# Patient Record
Sex: MALE | Race: WHITE | NOT HISPANIC OR LATINO | Employment: OTHER | URBAN - METROPOLITAN AREA
[De-identification: names, ages, dates, MRNs, and addresses within clinical notes are randomized per-mention and may not be internally consistent; named-entity substitution may affect disease eponyms.]

---

## 2023-07-05 ENCOUNTER — OFFICE VISIT (OUTPATIENT)
Dept: PRIMARY CARE | Facility: CLINIC | Age: 73
End: 2023-07-05
Payer: MEDICARE

## 2023-07-05 VITALS
SYSTOLIC BLOOD PRESSURE: 122 MMHG | TEMPERATURE: 97.1 F | BODY MASS INDEX: 34.67 KG/M2 | HEART RATE: 67 BPM | WEIGHT: 256 LBS | DIASTOLIC BLOOD PRESSURE: 71 MMHG | HEIGHT: 72 IN | OXYGEN SATURATION: 95 %

## 2023-07-05 DIAGNOSIS — I10 ESSENTIAL HYPERTENSION: ICD-10-CM

## 2023-07-05 DIAGNOSIS — R00.2 PALPITATIONS: ICD-10-CM

## 2023-07-05 DIAGNOSIS — Z00.00 ENCOUNTER FOR WELLNESS EXAMINATION: Primary | ICD-10-CM

## 2023-07-05 DIAGNOSIS — R42 DIZZINESS: ICD-10-CM

## 2023-07-05 DIAGNOSIS — M10.9 GOUT, UNSPECIFIED CAUSE, UNSPECIFIED CHRONICITY, UNSPECIFIED SITE: ICD-10-CM

## 2023-07-05 DIAGNOSIS — E78.2 MIXED HYPERLIPIDEMIA: ICD-10-CM

## 2023-07-05 DIAGNOSIS — E55.9 VITAMIN D DEFICIENCY: ICD-10-CM

## 2023-07-05 DIAGNOSIS — Z00.00 ROUTINE GENERAL MEDICAL EXAMINATION AT HEALTH CARE FACILITY: ICD-10-CM

## 2023-07-05 DIAGNOSIS — N18.32 CKD STAGE G3B/A2, GFR 30-44 AND ALBUMIN CREATININE RATIO 30-299 MG/G (MULTI): ICD-10-CM

## 2023-07-05 DIAGNOSIS — N40.0 BENIGN PROSTATIC HYPERPLASIA WITHOUT LOWER URINARY TRACT SYMPTOMS: ICD-10-CM

## 2023-07-05 PROBLEM — K21.9 ESOPHAGEAL REFLUX: Status: ACTIVE | Noted: 2023-07-05

## 2023-07-05 PROBLEM — Z96.21 COCHLEAR IMPLANT IN PLACE: Status: ACTIVE | Noted: 2017-08-01

## 2023-07-05 PROBLEM — E78.5 HYPERLIPEMIA: Status: ACTIVE | Noted: 2017-07-06

## 2023-07-05 LAB
ALANINE AMINOTRANSFERASE (SGPT) (U/L) IN SER/PLAS: 29 U/L (ref 10–52)
ALBUMIN (G/DL) IN SER/PLAS: 4.5 G/DL (ref 3.4–5)
ALKALINE PHOSPHATASE (U/L) IN SER/PLAS: 64 U/L (ref 33–136)
ANION GAP IN SER/PLAS: 13 MMOL/L (ref 10–20)
ASPARTATE AMINOTRANSFERASE (SGOT) (U/L) IN SER/PLAS: 19 U/L (ref 9–39)
BILIRUBIN TOTAL (MG/DL) IN SER/PLAS: 0.9 MG/DL (ref 0–1.2)
CALCIDIOL (25 OH VITAMIN D3) (NG/ML) IN SER/PLAS: 34 NG/ML
CALCIUM (MG/DL) IN SER/PLAS: 9.9 MG/DL (ref 8.6–10.6)
CARBON DIOXIDE, TOTAL (MMOL/L) IN SER/PLAS: 28 MMOL/L (ref 21–32)
CHLORIDE (MMOL/L) IN SER/PLAS: 105 MMOL/L (ref 98–107)
CHOLESTEROL (MG/DL) IN SER/PLAS: 188 MG/DL (ref 0–199)
CHOLESTEROL IN HDL (MG/DL) IN SER/PLAS: 42.9 MG/DL
CHOLESTEROL/HDL RATIO: 4.4
COBALAMIN (VITAMIN B12) (PG/ML) IN SER/PLAS: 343 PG/ML (ref 211–911)
CREAT UR STRIP-MCNC: 100 MG/DL
CREATININE (MG/DL) IN SER/PLAS: 1.61 MG/DL (ref 0.5–1.3)
ERYTHROCYTE DISTRIBUTION WIDTH (RATIO) BY AUTOMATED COUNT: 13.5 % (ref 11.5–14.5)
ERYTHROCYTE MEAN CORPUSCULAR HEMOGLOBIN CONCENTRATION (G/DL) BY AUTOMATED: 33.5 G/DL (ref 32–36)
ERYTHROCYTE MEAN CORPUSCULAR VOLUME (FL) BY AUTOMATED COUNT: 93 FL (ref 80–100)
ERYTHROCYTES (10*6/UL) IN BLOOD BY AUTOMATED COUNT: 4.2 X10E12/L (ref 4.5–5.9)
GFR MALE: 45 ML/MIN/1.73M2
GLUCOSE (MG/DL) IN SER/PLAS: 88 MG/DL (ref 74–99)
HEMATOCRIT (%) IN BLOOD BY AUTOMATED COUNT: 39.1 % (ref 41–52)
HEMOGLOBIN (G/DL) IN BLOOD: 13.1 G/DL (ref 13.5–17.5)
LDL: 114 MG/DL (ref 0–99)
LEUKOCYTES (10*3/UL) IN BLOOD BY AUTOMATED COUNT: 6 X10E9/L (ref 4.4–11.3)
MICROALBUMIN UR TEST STR-MCNC: 30 MG/L
NRBC (PER 100 WBCS) BY AUTOMATED COUNT: 0 /100 WBC (ref 0–0)
PLATELETS (10*3/UL) IN BLOOD AUTOMATED COUNT: 182 X10E9/L (ref 150–450)
POTASSIUM (MMOL/L) IN SER/PLAS: 4.7 MMOL/L (ref 3.5–5.3)
PROSTATE SPECIFIC AG (NG/ML) IN SER/PLAS: 2.26 NG/ML (ref 0–4)
PROT/CREAT UR: <30 UG/MG CREAT
PROTEIN TOTAL: 7.4 G/DL (ref 6.4–8.2)
SODIUM (MMOL/L) IN SER/PLAS: 141 MMOL/L (ref 136–145)
TRIGLYCERIDE (MG/DL) IN SER/PLAS: 157 MG/DL (ref 0–149)
URATE (MG/DL) IN SER/PLAS: 6.2 MG/DL (ref 4–7.5)
UREA NITROGEN (MG/DL) IN SER/PLAS: 29 MG/DL (ref 6–23)
VLDL: 31 MG/DL (ref 0–40)

## 2023-07-05 PROCEDURE — 3074F SYST BP LT 130 MM HG: CPT | Performed by: INTERNAL MEDICINE

## 2023-07-05 PROCEDURE — 82043 UR ALBUMIN QUANTITATIVE: CPT | Performed by: INTERNAL MEDICINE

## 2023-07-05 PROCEDURE — 80061 LIPID PANEL: CPT

## 2023-07-05 PROCEDURE — 82570 ASSAY OF URINE CREATININE: CPT | Performed by: INTERNAL MEDICINE

## 2023-07-05 PROCEDURE — 84550 ASSAY OF BLOOD/URIC ACID: CPT

## 2023-07-05 PROCEDURE — G0444 DEPRESSION SCREEN ANNUAL: HCPCS | Performed by: INTERNAL MEDICINE

## 2023-07-05 PROCEDURE — 80053 COMPREHEN METABOLIC PANEL: CPT

## 2023-07-05 PROCEDURE — 99215 OFFICE O/P EST HI 40 MIN: CPT | Performed by: INTERNAL MEDICINE

## 2023-07-05 PROCEDURE — 1170F FXNL STATUS ASSESSED: CPT | Performed by: INTERNAL MEDICINE

## 2023-07-05 PROCEDURE — 1160F RVW MEDS BY RX/DR IN RCRD: CPT | Performed by: INTERNAL MEDICINE

## 2023-07-05 PROCEDURE — 82607 VITAMIN B-12: CPT

## 2023-07-05 PROCEDURE — 93000 ELECTROCARDIOGRAM COMPLETE: CPT | Performed by: INTERNAL MEDICINE

## 2023-07-05 PROCEDURE — 82306 VITAMIN D 25 HYDROXY: CPT

## 2023-07-05 PROCEDURE — 3078F DIAST BP <80 MM HG: CPT | Performed by: INTERNAL MEDICINE

## 2023-07-05 PROCEDURE — G0439 PPPS, SUBSEQ VISIT: HCPCS | Performed by: INTERNAL MEDICINE

## 2023-07-05 PROCEDURE — 1159F MED LIST DOCD IN RCRD: CPT | Performed by: INTERNAL MEDICINE

## 2023-07-05 PROCEDURE — 85027 COMPLETE CBC AUTOMATED: CPT

## 2023-07-05 PROCEDURE — 84153 ASSAY OF PSA TOTAL: CPT

## 2023-07-05 PROCEDURE — 1036F TOBACCO NON-USER: CPT | Performed by: INTERNAL MEDICINE

## 2023-07-05 RX ORDER — ATORVASTATIN CALCIUM 10 MG/1
1 TABLET, FILM COATED ORAL DAILY
COMMUNITY
Start: 2013-12-17

## 2023-07-05 RX ORDER — LOSARTAN POTASSIUM 25 MG/1
1 TABLET ORAL DAILY
COMMUNITY
Start: 2021-07-16 | End: 2023-07-24

## 2023-07-05 RX ORDER — CHOLECALCIFEROL (VITAMIN D3) 50 MCG
1 TABLET ORAL DAILY
COMMUNITY
Start: 2015-09-03

## 2023-07-05 RX ORDER — PANTOPRAZOLE SODIUM 40 MG/1
TABLET, DELAYED RELEASE ORAL
COMMUNITY
Start: 2022-01-12 | End: 2024-05-16 | Stop reason: DRUGHIGH

## 2023-07-05 RX ORDER — FAMOTIDINE 20 MG/1
1 TABLET, FILM COATED ORAL NIGHTLY
COMMUNITY
Start: 2020-04-20 | End: 2023-10-23

## 2023-07-05 RX ORDER — ALLOPURINOL 300 MG/1
1 TABLET ORAL DAILY
COMMUNITY
Start: 2013-09-23 | End: 2024-01-29

## 2023-07-05 RX ORDER — TRIAMCINOLONE ACETONIDE 1 MG/G
CREAM TOPICAL
COMMUNITY
Start: 2023-06-08

## 2023-07-05 ASSESSMENT — ACTIVITIES OF DAILY LIVING (ADL)
DRESSING: INDEPENDENT
TAKING_MEDICATION: INDEPENDENT
DOING_HOUSEWORK: INDEPENDENT
BATHING: INDEPENDENT
GROCERY_SHOPPING: INDEPENDENT
MANAGING_FINANCES: INDEPENDENT

## 2023-07-05 ASSESSMENT — ENCOUNTER SYMPTOMS
OCCASIONAL FEELINGS OF UNSTEADINESS: 1
LOSS OF SENSATION IN FEET: 0
DEPRESSION: 0

## 2023-07-05 ASSESSMENT — PATIENT HEALTH QUESTIONNAIRE - PHQ9
2. FEELING DOWN, DEPRESSED OR HOPELESS: NOT AT ALL
SUM OF ALL RESPONSES TO PHQ9 QUESTIONS 1 AND 2: 0
1. LITTLE INTEREST OR PLEASURE IN DOING THINGS: NOT AT ALL

## 2023-07-05 NOTE — PROGRESS NOTES
Subjective   John Allen is a 72 y.o. male who presents for Medicare Annual Wellness Visit Subsequent.  HPI  Past medical history includes hypertension, hyperlipidemia, obesity, gastritis and PUD, and arthritic problems.    Ongoing issues after cataract surgery, seeing his ophthalmologist and retina specialist.       He has lost about 5lbs since last visit in JAN.  Diet  Continues aerobics 4x/week.      See visit JAN 2022 regarding recurrent GERD, continues on PPI on an as needed basis.  Has intermittent flares, responded with 10 day course.     Previously only on a beta blocker, metoprolol, for blood pressure. Denies any history of arrhythmia or tachycardia. However, he does continue to have intermittent palpitations. Typically at night, lasting several minutes. Every 2-3 weeks.  Occasionally with sweating, rare.  No change in frequency since discontinuation of beta-blocker.  Previous EKGs reviewed and unremarkable.  Losartan 25 mg daily started in July 2021.  Blood pressure generally controlled, he reports it is usually under 130/80 at home.   He is on atorvastatin and fish oil for primary prevention of cardiovascular disease.  LH and renal fxn improved after discontinuing metop succ.     Occasional vertigo, has had vestibular rehab in the past; ever since cochlear surgery.  More recently getting it while trying to read/focus on nearby object, not necessarily with movement.     He also has a lot of arthritic issues, he has had bilateral shoulder replacement, says he also needs bilateral knee replacements but trying to avoid, coping.  Also with hx of sciatic pain.     Previously re: abdominal pain, saw Dr. Ortiz who confirmed left inguinal hernia, but thought pain was MSK and likely unrelated to hernia. Offered elective if preferred. Not a concern.     He is , goes to the gym fairly regularly, remote smoker.   Exercising regularly, weights, aerobics several times per week.           Active  Problems  Problems    · Abdominal pain, epigastric (789.06) (R10.13)   · Benign enlargement of prostate (600.00) (N40.0)   · CKD stage G3b/A2, GFR 30-44 and albumin creatinine ratio  mg/g (585.3)  (N18.32)   · Class 2 obesity due to excess calories with body mass index (BMI) of 35.0 to 35.9 in  adult (278.00,V85.35) (E66.09,Z68.35)   · Elevated serum cholesterol (272.9) (E78.9)   · Esophageal reflux (530.81) (K21.9)   · Gastritis, chronic (535.10) (K29.50)   · Gout (274.9) (M10.9)   · Hemorrhoids (455.6) (K64.9)   · Hypertension (401.9) (I10)   · Added by Problem List Migration; 2013-8-25   · Irregular bowel habits (569.89) (R19.8)   · Knee pain (719.46) (M25.569)   · Left inguinal hernia (550.90) (K40.90)   · Left sciatic nerve pain (724.3) (M54.32)   · Nausea in adult (787.02) (R11.0)   · Osteoarthrosis, shoulder region (715.91) (M19.019)   · Palpitations (785.1) (R00.2)   · Primary osteoarthritis of both hips (715.15) (M16.0)   · Primary osteoarthritis of left knee (715.16) (M17.12)   · Right kidney stone (592.0) (N20.0)   · Seborrheic keratoses (702.19) (L82.1)   · Sensorineural hearing loss, bilateral (389.18) (H90.3)   · Trigger ring finger of left hand (727.03) (M65.342)   · Vitamin D deficiency (268.9) (E55.9)     Past Medical History  Problems    · History of Dysuria (788.1) (R30.0)   · History of Encounter for vaccination (V05.9) (Z23)   · Resolved Date: 20 May 2017   · History of Hearing loss, bilateral (389.9) (H91.93)   · History of abdominal pain (V13.89) (Z87.898)   · Resolved Date: 27 Apr 2016   · History of diarrhea (V12.79) (Z87.898)   · Resolved Date: 20 Nov 2017   · History of gastric ulcer (V12.79) (Z87.11)   · History of Pre-operative exam (V72.84) (Z01.818)   · Resolved Date: 20 May 2017   · History of Screening for abdominal aortic aneurysm (V81.2) (Z13.6)   · Resolved Date: 20 May 2017     Surgical History  Problems    · History of Arthroscopy Knee Left   · History of Arthroscopy Knee  "Right   · History of Cochlear implant surgery   · History of Complete Colonoscopy   · Dr. Farhat Carrasco 1/31/2012 repeat in 5 years   · History of Umbilical hernia repair     Family History  Mother    · Family history of Lung Cancer (V16.1)  Father    · Denied: Family history of Acute Myocardial Infarction   · Family history of Colon Cancer (V16.0)  Maternal Grandmother    · Family history of Diabetes Mellitus (V18.0)  Maternal Grandfather    · Family history of Acute Myocardial Infarction (V17.3)     Social History  Problems    · Former smoker (V15.82) (Z87.891)   · stopped 40 yrs ago   · Marital History - Currently    · Never Drank Alcohol   · Working Part-time              Objective   /76   Pulse 70   Temp 36.2 °C (97.1 °F)   Ht 1.829 m (6')   Wt 116 kg (256 lb)   SpO2 95%   BMI 34.72 kg/m²    Physical Exam  Gen: NAD, pleasant, A&Ox3  HEENT: PERRL, EOMI, MMM, OP clear  Neck: supple, no thyromegaly, no JVD, normal carotid upstroke  Pulm: lungs CTAB, good air movement  CV: RRR, no m/r/g, 2+ DP pulses  Abd: NABS, soft, mild epigastric discomfort, ND no HSM  : normal male anatomy, left inguinal hernia, no perirectal pathology, normal tone, smooth, non-nodular prostate ~25g  Ext: no peripheral edema  Neuro: CN II-XII intact, no focal sensory or motor deficits, normal reflexes  Skin: Scattered benign lesions including seborrheic keratoses and angiomas, small callous v plantar wart on lateral 4th digit left; left 4th flexor tendon with scarring and some sticking    Assessment/Plan      Constipation with intermittent pain: recommend daily fiber supplementation (Metamucil); says it has been a \"godsend\"     GERD: Continue pantoprazole prn, famotidine daily; f/u with GI prn     Left inguinal hernia: Referred to general surgery, can return on elective basis currently asymptomatic     Hypertension/CKD G3b/A2:   - improved albuminuria blood pressure control since starting losartan 25 mg, continue  - " unremarkable EKG, JUL 2022; off beta-blocker without change in pattern of palpitations, minimally symptomatic    Hyperlipidemia: Continue statin     Hx of gout: recheck uric acid levels, cont allopurinol     Health maintenance  -Last colonoscopy: 5/14/2020, 1 HP Repeat: 5 years, given family hx  -PSA: 2.03 ng/mL JUL 2022  -Smoking history: Remote smoker, reports negative AAA  -Counseled regarding diet and exercise  -Immunizations: UTD  - annual derm visit  -Followup in 6-12 months, sooner if needed  Problem List Items Addressed This Visit    None           Alejandro Argueta MD

## 2023-07-24 DIAGNOSIS — N18.32 CKD STAGE G3B/A2, GFR 30-44 AND ALBUMIN CREATININE RATIO 30-299 MG/G (MULTI): Primary | ICD-10-CM

## 2023-07-24 DIAGNOSIS — I10 ESSENTIAL HYPERTENSION: ICD-10-CM

## 2023-07-24 RX ORDER — LOSARTAN POTASSIUM 25 MG/1
25 TABLET ORAL DAILY
Qty: 90 TABLET | Refills: 3 | Status: SHIPPED | OUTPATIENT
Start: 2023-07-24

## 2023-08-21 ENCOUNTER — OFFICE VISIT (OUTPATIENT)
Dept: PRIMARY CARE | Facility: CLINIC | Age: 73
End: 2023-08-21
Payer: MEDICARE

## 2023-08-21 VITALS
HEART RATE: 82 BPM | SYSTOLIC BLOOD PRESSURE: 129 MMHG | OXYGEN SATURATION: 96 % | TEMPERATURE: 97.3 F | BODY MASS INDEX: 34.72 KG/M2 | DIASTOLIC BLOOD PRESSURE: 75 MMHG | WEIGHT: 256 LBS

## 2023-08-21 DIAGNOSIS — M54.50 ACUTE LEFT-SIDED LOW BACK PAIN WITHOUT SCIATICA: Primary | ICD-10-CM

## 2023-08-21 PROCEDURE — 1160F RVW MEDS BY RX/DR IN RCRD: CPT | Performed by: INTERNAL MEDICINE

## 2023-08-21 PROCEDURE — 1036F TOBACCO NON-USER: CPT | Performed by: INTERNAL MEDICINE

## 2023-08-21 PROCEDURE — 1159F MED LIST DOCD IN RCRD: CPT | Performed by: INTERNAL MEDICINE

## 2023-08-21 PROCEDURE — 3074F SYST BP LT 130 MM HG: CPT | Performed by: INTERNAL MEDICINE

## 2023-08-21 PROCEDURE — 3078F DIAST BP <80 MM HG: CPT | Performed by: INTERNAL MEDICINE

## 2023-08-21 PROCEDURE — 99213 OFFICE O/P EST LOW 20 MIN: CPT | Performed by: INTERNAL MEDICINE

## 2023-08-21 RX ORDER — DICLOFENAC SODIUM 10 MG/G
4 GEL TOPICAL 4 TIMES DAILY
Qty: 100 G | Refills: 1 | Status: SHIPPED | OUTPATIENT
Start: 2023-08-21

## 2023-08-21 NOTE — PROGRESS NOTES
Subjective   John Allen is a 73 y.o. male who presents for Back Pain.  HPI  Started about 3 weeks ago while doing leg curls.  Felt pain in back of thigh and left buttock.  Has since decreased weight and not hurting.  Last week started having LL back pain occurring with walking fast.  Continued to be sore, turned and had a sharp pain in the left lower back, took breath away.  No radiation.  Now using a cane.  Took tylenol and heating pad.  The next day slightly better.  Went shopping and pain increased throughout the day.  Today felt okay in the morning and did 30 minutes on the elliptical but when walking the track started hurting again.    Several days did leg press and leg curls and not bothered.    When radiating, just going down from low back to low buttock, more of a weakness.  Only last Saturday did he have more of an electric shock pain, even then mostly in the lower back.  Radiating out, not shooting.     Worse with walking, only in the low back at those times.  No distal numbness, tingling or weakness.    He called Dr. Hay but was told he needs referral and MRI.      Objective   /75   Pulse 82   Temp 36.3 °C (97.3 °F)   Wt 116 kg (256 lb)   SpO2 96%   BMI 34.72 kg/m²    Physical Exam  NAD, slightly antalgic gait  Normal strength and sensation to BLLE, symmetric  Neg Trendelenberg  Neg SLR  Neg log roll  No trochanteric TTP  Spine midline  No reproducible pain    Assessment/Plan   LBP: no neurogenic/radicular component on exam and history  - no imaging needed at this time  - consider PT  - continue conservative management, stretching and gentle strengthening  - trial of topical NSAID, systemic should be avoided  Problem List Items Addressed This Visit    None           Alejandro Argueta MD

## 2023-10-12 ENCOUNTER — APPOINTMENT (OUTPATIENT)
Dept: URGENT CARE | Facility: CLINIC | Age: 73
End: 2023-10-12
Payer: MEDICARE

## 2023-10-13 ENCOUNTER — APPOINTMENT (OUTPATIENT)
Dept: RADIOLOGY | Facility: HOSPITAL | Age: 73
End: 2023-10-13
Payer: MEDICARE

## 2023-10-13 ENCOUNTER — HOSPITAL ENCOUNTER (EMERGENCY)
Facility: HOSPITAL | Age: 73
Discharge: HOME | End: 2023-10-13
Attending: EMERGENCY MEDICINE
Payer: MEDICARE

## 2023-10-13 VITALS
HEIGHT: 72 IN | DIASTOLIC BLOOD PRESSURE: 67 MMHG | SYSTOLIC BLOOD PRESSURE: 168 MMHG | WEIGHT: 250 LBS | TEMPERATURE: 97.7 F | OXYGEN SATURATION: 98 % | RESPIRATION RATE: 18 BRPM | HEART RATE: 82 BPM | BODY MASS INDEX: 33.86 KG/M2

## 2023-10-13 DIAGNOSIS — R05.1 ACUTE COUGH: Primary | ICD-10-CM

## 2023-10-13 LAB
FLUAV RNA RESP QL NAA+PROBE: NOT DETECTED
FLUBV RNA RESP QL NAA+PROBE: NOT DETECTED
SARS-COV-2 RNA RESP QL NAA+PROBE: NOT DETECTED

## 2023-10-13 PROCEDURE — 87636 SARSCOV2 & INF A&B AMP PRB: CPT | Performed by: STUDENT IN AN ORGANIZED HEALTH CARE EDUCATION/TRAINING PROGRAM

## 2023-10-13 PROCEDURE — 70360 X-RAY EXAM OF NECK: CPT

## 2023-10-13 PROCEDURE — 71045 X-RAY EXAM CHEST 1 VIEW: CPT | Performed by: RADIOLOGY

## 2023-10-13 PROCEDURE — 99284 EMERGENCY DEPT VISIT MOD MDM: CPT | Mod: 25

## 2023-10-13 PROCEDURE — 71045 X-RAY EXAM CHEST 1 VIEW: CPT

## 2023-10-13 PROCEDURE — 99284 EMERGENCY DEPT VISIT MOD MDM: CPT | Performed by: EMERGENCY MEDICINE

## 2023-10-13 RX ORDER — PREDNISONE 50 MG/1
50 TABLET ORAL DAILY
Qty: 5 TABLET | Refills: 0 | Status: SHIPPED | OUTPATIENT
Start: 2023-10-13 | End: 2023-10-18

## 2023-10-13 ASSESSMENT — LIFESTYLE VARIABLES
HAVE YOU EVER FELT YOU SHOULD CUT DOWN ON YOUR DRINKING: NO
HAVE PEOPLE ANNOYED YOU BY CRITICIZING YOUR DRINKING: NO
EVER HAD A DRINK FIRST THING IN THE MORNING TO STEADY YOUR NERVES TO GET RID OF A HANGOVER: NO
REASON UNABLE TO ASSESS: NO
EVER FELT BAD OR GUILTY ABOUT YOUR DRINKING: NO

## 2023-10-13 ASSESSMENT — COLUMBIA-SUICIDE SEVERITY RATING SCALE - C-SSRS
6. HAVE YOU EVER DONE ANYTHING, STARTED TO DO ANYTHING, OR PREPARED TO DO ANYTHING TO END YOUR LIFE?: NO
2. HAVE YOU ACTUALLY HAD ANY THOUGHTS OF KILLING YOURSELF?: NO
1. IN THE PAST MONTH, HAVE YOU WISHED YOU WERE DEAD OR WISHED YOU COULD GO TO SLEEP AND NOT WAKE UP?: NO

## 2023-10-13 ASSESSMENT — PAIN SCALES - GENERAL
PAINLEVEL_OUTOF10: 0 - NO PAIN
PAINLEVEL_OUTOF10: 2

## 2023-10-13 ASSESSMENT — PAIN DESCRIPTION - LOCATION: LOCATION: THROAT

## 2023-10-13 ASSESSMENT — PAIN - FUNCTIONAL ASSESSMENT
PAIN_FUNCTIONAL_ASSESSMENT: 0-10
PAIN_FUNCTIONAL_ASSESSMENT: 0-10

## 2023-10-13 NOTE — ED PROVIDER NOTES
HPI   Chief Complaint   Patient presents with    Cough     Patient states cough x1 week and also feels like something is stuck in his throat       HPI  Patient is a 73-year-old male with past medical history of hyperlipidemia, hypertension, GERD who presents the emergency department today due to concern for cough.  He states he feels like tickling in his throat.  He states that cough began Monday.  He denies any fevers, chills, chest pain, abdominal pain.  He denies any recent sick contacts.  He took 2 negative COVID test at home however was waiting for third today.  He was prescribed benzoate at urgent care which has not helped with the symptoms.  He denies any chest pain, abdominal pain, nausea, vomiting.                    [place] Coma Scale Score: 15                  Patient History   Past Medical History:   Diagnosis Date    Dysuria     Dysuria    Encounter for immunization 11/07/2016    Encounter for vaccination    Encounter for other preprocedural examination 11/07/2016    Pre-operative exam    Encounter for screening for cardiovascular disorders 05/10/2016    Screening for abdominal aortic aneurysm    Personal history of other specified conditions 04/27/2016    History of diarrhea    Personal history of other specified conditions 09/01/2015    History of abdominal pain    Personal history of peptic ulcer disease 04/20/2020    History of gastric ulcer    Unspecified hearing loss, bilateral 09/03/2015    Hearing loss, bilateral     Past Surgical History:   Procedure Laterality Date    COLONOSCOPY  04/23/2015    Complete Colonoscopy    KNEE ARTHROSCOPY W/ DEBRIDEMENT  12/17/2013    Arthroscopy Knee Left    KNEE ARTHROSCOPY W/ DEBRIDEMENT  12/17/2013    Arthroscopy Knee Right    OTHER SURGICAL HISTORY  08/14/2019    Umbilical hernia repair    OTHER SURGICAL HISTORY  08/14/2019    Cochlear implant surgery     No family history on file.  Social History     Tobacco Use    Smoking status: Former     Types:  Cigarettes    Smokeless tobacco: Never   Substance Use Topics    Alcohol use: Not Currently    Drug use: Never       Physical Exam   ED Triage Vitals [10/13/23 0712]   Temp Heart Rate Resp BP   36.5 °C (97.7 °F) 83 18 170/81      SpO2 Temp Source Heart Rate Source Patient Position   98 % Temporal Monitor Sitting      BP Location FiO2 (%)     Right arm --       Physical Exam  Vitals and nursing note reviewed.   Constitutional:       General: He is not in acute distress.     Appearance: He is well-developed.   HENT:      Head: Normocephalic and atraumatic.   Eyes:      Conjunctiva/sclera: Conjunctivae normal.   Cardiovascular:      Rate and Rhythm: Normal rate and regular rhythm.      Heart sounds: No murmur heard.  Pulmonary:      Effort: Pulmonary effort is normal. No respiratory distress.      Breath sounds: Normal breath sounds.   Abdominal:      Palpations: Abdomen is soft.      Tenderness: There is no abdominal tenderness.   Musculoskeletal:         General: No swelling.      Cervical back: Neck supple. No tenderness.   Lymphadenopathy:      Cervical: No cervical adenopathy.   Skin:     General: Skin is warm and dry.      Capillary Refill: Capillary refill takes less than 2 seconds.   Neurological:      Mental Status: He is alert.   Psychiatric:         Mood and Affect: Mood normal.       ED Course & MDM   ED Course as of 10/13/23 1628   Fri Oct 13, 2023   0738 Patient is a 73-year-old male who presents the ER due to concern for cough.  On arrival, patient was in no acute distress and vital signs are stable.  Did get COVID and influenza swab for patient.  He did also get chest x-ray and neck x-ray as patient family member him more requesting this.  Do not feel nasopharyngeal endoscopy warranted at this time as he did not have concern for obstruction.  He does not have any signs of Brenda's angina. [MJ]   0909 Influenza A, and B PCR [MJ]   0910 Flu A Result: Not Detected [MJ]   0910 Flu B Result: Not Detected  [MJ]   0910 Sars-CoV-2 PCR, Symptomatic [MJ]   0910 Coronavirus 2019, PCR: Not Detected  COVID and flu negative. [MJ]   0910 XR chest 1 view  No acute cardiopulmonary process [MJ]   0941 XR neck soft tissue  IMPRESSION:  Epiglottis appears at least mildly thickened   [MJ]   0941 X-ray imaging of neck showed mildly thickened epiglottis.  Do feel comfortable with discharging patient home with ear nose and throat follow-up.  They were prescribed prednisone burst for home.  They were given referral to ENT and instructed to follow-up with them and schedule appointment as well as with primary care physician.  They were given strict return precautions.  Patient was understanding and agreeable with plan for discharge. [MJ]      ED Course User Index  [MJ] Ousmane Davila DO         Diagnoses as of 10/13/23 1628   Acute cough       Medical Decision Making      Procedure  Procedures     Ousmane Davila DO  Resident  10/13/23 1626

## 2023-10-13 NOTE — DISCHARGE INSTRUCTIONS
Please follow-up with your primary care provider to discuss your ER visit.  Please also follow-up in a scheduled appointment as soon as possible with the ear nose and throat to discuss your ER visit.  If you develop any severe shortness of breath, increased work of breathing, or if you have any other concerns, please return to the nearest ER for further care.

## 2023-10-20 DIAGNOSIS — K21.9 GASTROESOPHAGEAL REFLUX DISEASE WITHOUT ESOPHAGITIS: Primary | ICD-10-CM

## 2023-10-23 RX ORDER — FAMOTIDINE 20 MG/1
20 TABLET, FILM COATED ORAL NIGHTLY
Qty: 90 TABLET | Refills: 3 | Status: SHIPPED | OUTPATIENT
Start: 2023-10-23

## 2023-10-26 ENCOUNTER — OFFICE VISIT (OUTPATIENT)
Dept: PRIMARY CARE | Facility: CLINIC | Age: 73
End: 2023-10-26
Payer: MEDICARE

## 2023-10-26 VITALS
SYSTOLIC BLOOD PRESSURE: 117 MMHG | WEIGHT: 253 LBS | BODY MASS INDEX: 34.31 KG/M2 | OXYGEN SATURATION: 92 % | HEART RATE: 84 BPM | TEMPERATURE: 97.5 F | DIASTOLIC BLOOD PRESSURE: 67 MMHG

## 2023-10-26 DIAGNOSIS — R05.3 CHRONIC COUGH: ICD-10-CM

## 2023-10-26 DIAGNOSIS — K21.9 GASTROESOPHAGEAL REFLUX DISEASE WITHOUT ESOPHAGITIS: Primary | ICD-10-CM

## 2023-10-26 PROCEDURE — 99213 OFFICE O/P EST LOW 20 MIN: CPT | Performed by: INTERNAL MEDICINE

## 2023-10-26 PROCEDURE — 3078F DIAST BP <80 MM HG: CPT | Performed by: INTERNAL MEDICINE

## 2023-10-26 PROCEDURE — 1160F RVW MEDS BY RX/DR IN RCRD: CPT | Performed by: INTERNAL MEDICINE

## 2023-10-26 PROCEDURE — 1126F AMNT PAIN NOTED NONE PRSNT: CPT | Performed by: INTERNAL MEDICINE

## 2023-10-26 PROCEDURE — 1159F MED LIST DOCD IN RCRD: CPT | Performed by: INTERNAL MEDICINE

## 2023-10-26 PROCEDURE — 1036F TOBACCO NON-USER: CPT | Performed by: INTERNAL MEDICINE

## 2023-10-26 PROCEDURE — 3074F SYST BP LT 130 MM HG: CPT | Performed by: INTERNAL MEDICINE

## 2023-10-26 NOTE — PROGRESS NOTES
Subjective   John Allen is a 73 y.o. male who presents for Cough.  HPI  Went to , given T.P. which did not help.  Went to the ED at [facility] on Friday 10/13/2023 with cough and globus sensation since that prior Monday.  No symptoms of infection and no sick contacts reported.  He had taken 2 negative COVID tests and repeat testing for that and influenza were negative.  He still got a chest x-ray and an x-ray of the neck which were unremarkable save for some possible thickened epiglottis.  He was prescribed prednisone 50mg daily for 5 days (didn't do anything) and discharged with an ENT referral.  Saw ENT on 10/19/2023, Dr. Diamond who did laryngoscopy and suspected LPR and recommended using PPI.  Was also told he could use Tessalon Perles and prescribed Hycodan if needed.    Today, reports it started after eye surgery and being put on prednisolone gtt.  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    Says there were times he felt his throat is burning, cough might be slightly better.  Only taking famotidine and did not take PPI as mentioned in ENT note.    Objective   /67   Pulse 84   Temp 36.4 °C (97.5 °F)   Wt 115 kg (253 lb)   SpO2 92%   BMI 34.31 kg/m²    Physical Exam  Nad  Mmm, clear op  No lymphadenopathy  No stridor  Lungs CTAB  NABS, NT  RRR    Assessment/Plan     Still suspect LER; low suspicion of eye drops causing.  Supported by failure to respond to steroids and cough medicine.  Only using H2 blocker right now.    Restart PPI for a few weeks.  Problem List Items Addressed This Visit    None           Alejandro Argueta MD

## 2024-01-10 ENCOUNTER — OFFICE VISIT (OUTPATIENT)
Dept: PRIMARY CARE | Facility: CLINIC | Age: 74
End: 2024-01-10
Payer: MEDICARE

## 2024-01-10 VITALS
OXYGEN SATURATION: 95 % | SYSTOLIC BLOOD PRESSURE: 148 MMHG | HEART RATE: 77 BPM | TEMPERATURE: 97.3 F | WEIGHT: 257 LBS | BODY MASS INDEX: 34.86 KG/M2 | DIASTOLIC BLOOD PRESSURE: 66 MMHG

## 2024-01-10 DIAGNOSIS — E78.49 OTHER HYPERLIPIDEMIA: Primary | ICD-10-CM

## 2024-01-10 PROCEDURE — 80061 LIPID PANEL: CPT

## 2024-01-10 PROCEDURE — 3077F SYST BP >= 140 MM HG: CPT | Performed by: INTERNAL MEDICINE

## 2024-01-10 PROCEDURE — 3078F DIAST BP <80 MM HG: CPT | Performed by: INTERNAL MEDICINE

## 2024-01-10 PROCEDURE — 1036F TOBACCO NON-USER: CPT | Performed by: INTERNAL MEDICINE

## 2024-01-10 PROCEDURE — 36415 COLL VENOUS BLD VENIPUNCTURE: CPT

## 2024-01-10 PROCEDURE — 99214 OFFICE O/P EST MOD 30 MIN: CPT | Performed by: INTERNAL MEDICINE

## 2024-01-10 PROCEDURE — 1126F AMNT PAIN NOTED NONE PRSNT: CPT | Performed by: INTERNAL MEDICINE

## 2024-01-10 NOTE — PROGRESS NOTES
Subjective   John Allen is a 73 y.o. male who presents for Follow-up.  HPI  Here for follow up. He states his chronic cough is improving. He continues to use the famotidine before he goes to bed. He has some pantoprazole left but doesn't use it. Saw ENT on 10/19/2023, Dr. Diamond who did laryngoscopy and suspected LPR and recommended using PPI.    He recently had positive COVID test and had fevers/chills with it. Last week, he got the RSV/flu/COVID vaccine.     [place] some visual issues (like distorted letters while reading) in the L eye, which he had the lens replaced in September. No eye pain or blurriness.     Objective   /66   Pulse 77   Temp 36.3 °C (97.3 °F)   Wt 117 kg (257 lb)   SpO2 95%   BMI 34.86 kg/m²    Physical Exam  Constitutional:       Appearance: Normal appearance.   HENT:      Mouth/Throat:      Mouth: Mucous membranes are moist.      Pharynx: No oropharyngeal exudate or posterior oropharyngeal erythema.   Eyes:      Extraocular Movements: Extraocular movements intact.      Conjunctiva/sclera: Conjunctivae normal.      Pupils: Pupils are equal, round, and reactive to light.   Cardiovascular:      Rate and Rhythm: Normal rate and regular rhythm.      Pulses: Normal pulses.      Heart sounds: No murmur heard.  Pulmonary:      Breath sounds: Normal breath sounds. No wheezing.   Abdominal:      General: Bowel sounds are normal.      Palpations: Abdomen is soft.   Musculoskeletal:      Comments: Small ganglion cyst on left middle PIP joint   Skin:     General: Skin is warm and dry.   Neurological:      Mental Status: He is alert. Mental status is at baseline.           Assessment/Plan   73 year old male here for follow up visit. No medication changes since last visit.     HTN: controlled on losartan, repeat in office /71    HLD: on atorvastatin 20mg, recheck lipids today. Will check other labs at [facility] in 6 months.      GERD: on famotidine, saw ENT in 10/2023 and chronic cough thought to be due to GERD      Problem List Items Addressed This Visit    None           Estefani Aburto MD

## 2024-01-10 NOTE — PROGRESS NOTES
I saw and evaluated the patient. I personally obtained the key and critical portions of the history and physical exam or was physically present for key and critical portions performed by the resident/fellow. I reviewed the resident/fellow's documentation and discussed the patient with the resident/fellow. I agree with the resident/fellow's medical decision making as documented in the note.    Alejandro Argueta MD

## 2024-01-11 LAB
CHOLEST SERPL-MCNC: 171 MG/DL (ref 0–199)
CHOLESTEROL/HDL RATIO: 4.2
HDLC SERPL-MCNC: 40.9 MG/DL
LDLC SERPL CALC-MCNC: 88 MG/DL
NON HDL CHOLESTEROL: 130 MG/DL (ref 0–149)
TRIGL SERPL-MCNC: 210 MG/DL (ref 0–149)
VLDL: 42 MG/DL (ref 0–40)

## 2024-01-27 DIAGNOSIS — M10.9 GOUT, UNSPECIFIED CAUSE, UNSPECIFIED CHRONICITY, UNSPECIFIED SITE: Primary | ICD-10-CM

## 2024-01-29 RX ORDER — ALLOPURINOL 300 MG/1
300 TABLET ORAL DAILY
Qty: 90 TABLET | Refills: 3 | Status: SHIPPED | OUTPATIENT
Start: 2024-01-29

## 2024-03-12 ENCOUNTER — OFFICE VISIT (OUTPATIENT)
Dept: PRIMARY CARE | Facility: CLINIC | Age: 74
End: 2024-03-12
Payer: MEDICARE

## 2024-03-12 VITALS
OXYGEN SATURATION: 96 % | TEMPERATURE: 97.1 F | SYSTOLIC BLOOD PRESSURE: 120 MMHG | DIASTOLIC BLOOD PRESSURE: 71 MMHG | HEART RATE: 80 BPM

## 2024-03-12 DIAGNOSIS — M62.830 SPASM OF THORACIC BACK MUSCLE: Primary | ICD-10-CM

## 2024-03-12 PROCEDURE — 3074F SYST BP LT 130 MM HG: CPT | Performed by: INTERNAL MEDICINE

## 2024-03-12 PROCEDURE — 3078F DIAST BP <80 MM HG: CPT | Performed by: INTERNAL MEDICINE

## 2024-03-12 PROCEDURE — 1126F AMNT PAIN NOTED NONE PRSNT: CPT | Performed by: INTERNAL MEDICINE

## 2024-03-12 PROCEDURE — 1036F TOBACCO NON-USER: CPT | Performed by: INTERNAL MEDICINE

## 2024-03-12 PROCEDURE — 99212 OFFICE O/P EST SF 10 MIN: CPT | Performed by: INTERNAL MEDICINE

## 2024-03-12 NOTE — PROGRESS NOTES
Subjective   John Allen is a 73 y.o. male who presents for Back Pain.  HPI  Patient presents after an episode of pain in the left upper back.  Primary concern is to rule out cardiovascular equivalent.    Says he woke up about 2:30 AM Monday morning from a severe crampy pain under his posterior left shoulder blade.  Started very hard like a muscle cramp and then slowly decreased in intensity.  Laying back found that he was more comfortable depending on his position.  Was not associated with shortness of breath, diaphoresis or chest pain or pressure.  He had lifted something heavy the prior day.  He was able to fall back asleep after an hour or 2 and the next morning went to the gym, skipping weightlifting but doing his normal aerobics routine without any dyspnea, worsening of his symptoms.  Still had some mild soreness in the back throughout the day, his wife gave him a massage last night and today there is no pain residually.    He has not had any numbness weakness or tingling, no leg swelling, no orthopnea, no change in exertional capacity.  Objective   /71   Pulse 80   Temp 36.2 °C (97.1 °F)   SpO2 96%    Physical Exam  NAD  Normal carotid, brachial, PT pulses to all extremities.  No carotid supraclavicular or abdominal bruit.  Blood pressure equal in both sides.  Heart is regular rate and rhythm without any murmurs rubs or gallops.  Lungs are clear to auscultation  There is a palpable trigger point on the left upper lateral back which when palpated does reproduce some of the soreness he had previously    Assessment/Plan     Musculoskeletal back spasm: Reassured, no indications this was cardiac  Problem List Items Addressed This Visit    None           Alejandro Argueta MD

## 2024-03-14 ENCOUNTER — APPOINTMENT (OUTPATIENT)
Dept: PRIMARY CARE | Facility: CLINIC | Age: 74
End: 2024-03-14
Payer: MEDICARE

## 2024-05-07 ENCOUNTER — TELEPHONE (OUTPATIENT)
Dept: GASTROENTEROLOGY | Facility: CLINIC | Age: 74
End: 2024-05-07
Payer: MEDICARE

## 2024-05-07 NOTE — TELEPHONE ENCOUNTER
Pt went to the er last night for a discomfort in his stomach. He had nausea, severe burning pain, and overall feeling poor. He went to [facility] in [facility]. They recommended he have an egd. He will be home Saturday.  He will fax over his paperwork from the er visit.

## 2024-05-08 ENCOUNTER — PATIENT OUTREACH (OUTPATIENT)
Dept: CARE COORDINATION | Facility: CLINIC | Age: 74
End: 2024-05-08
Payer: MEDICARE

## 2024-05-08 SDOH — ECONOMIC STABILITY: FOOD INSECURITY
ARE ANY OF YOUR NEEDS URGENT? FOR EXAMPLE, UNCERTAINTY OF WHERE YOU WILL GET YOUR NEXT MEAL OR NOT HAVING THE MEDICATIONS YOU NEED TO TAKE TOMORROW.: NO

## 2024-05-08 SDOH — ECONOMIC STABILITY: GENERAL: WOULD YOU LIKE HELP WITH ANY OF THE FOLLOWING NEEDS?: I DONT NEED HELP WITH ANY OF THESE

## 2024-05-16 ENCOUNTER — OFFICE VISIT (OUTPATIENT)
Dept: GASTROENTEROLOGY | Facility: EXTERNAL LOCATION | Age: 74
End: 2024-05-16
Payer: MEDICARE

## 2024-05-16 DIAGNOSIS — K44.9 HIATAL HERNIA: ICD-10-CM

## 2024-05-16 DIAGNOSIS — I10 ESSENTIAL HYPERTENSION: ICD-10-CM

## 2024-05-16 DIAGNOSIS — R10.13 EPIGASTRIC PAIN: ICD-10-CM

## 2024-05-16 DIAGNOSIS — N18.32 CKD STAGE G3B/A2, GFR 30-44 AND ALBUMIN CREATININE RATIO 30-299 MG/G (MULTI): ICD-10-CM

## 2024-05-16 DIAGNOSIS — K29.00 ACUTE SUPERFICIAL GASTRITIS WITHOUT HEMORRHAGE: Primary | ICD-10-CM

## 2024-05-16 DIAGNOSIS — R10.33 PERIUMBILICAL PAIN: ICD-10-CM

## 2024-05-16 PROCEDURE — 88305 TISSUE EXAM BY PATHOLOGIST: CPT | Performed by: PATHOLOGY

## 2024-05-16 PROCEDURE — 43239 EGD BIOPSY SINGLE/MULTIPLE: CPT | Performed by: INTERNAL MEDICINE

## 2024-05-16 PROCEDURE — 0753T DGTZ GLS MCRSCP SLD LEVEL IV: CPT

## 2024-05-16 PROCEDURE — 88305 TISSUE EXAM BY PATHOLOGIST: CPT

## 2024-05-16 RX ORDER — PANTOPRAZOLE SODIUM 40 MG/1
40 TABLET, DELAYED RELEASE ORAL
Qty: 180 TABLET | Refills: 3 | Status: SHIPPED | OUTPATIENT
Start: 2024-05-16 | End: 2025-05-16

## 2024-05-17 ENCOUNTER — LAB REQUISITION (OUTPATIENT)
Dept: LAB | Facility: HOSPITAL | Age: 74
End: 2024-05-17
Payer: MEDICARE

## 2024-05-28 LAB
LABORATORY COMMENT REPORT: NORMAL
PATH REPORT.FINAL DX SPEC: NORMAL
PATH REPORT.GROSS SPEC: NORMAL
PATH REPORT.RELEVANT HX SPEC: NORMAL
PATH REPORT.TOTAL CANCER: NORMAL

## 2024-05-28 NOTE — RESULT ENCOUNTER NOTE
Mild chronic gastritis.  No H. pylori.  -Not clinically significant nor a cause of symptoms.    Follow-up as needed.

## 2024-07-12 ENCOUNTER — APPOINTMENT (OUTPATIENT)
Dept: PRIMARY CARE | Facility: CLINIC | Age: 74
End: 2024-07-12
Payer: MEDICARE

## 2024-07-12 VITALS
OXYGEN SATURATION: 94 % | HEART RATE: 73 BPM | SYSTOLIC BLOOD PRESSURE: 134 MMHG | DIASTOLIC BLOOD PRESSURE: 83 MMHG | WEIGHT: 259 LBS | HEIGHT: 72 IN | BODY MASS INDEX: 35.08 KG/M2

## 2024-07-12 DIAGNOSIS — N18.32 CKD STAGE G3B/A2, GFR 30-44 AND ALBUMIN CREATININE RATIO 30-299 MG/G (MULTI): ICD-10-CM

## 2024-07-12 DIAGNOSIS — I10 ESSENTIAL HYPERTENSION: ICD-10-CM

## 2024-07-12 DIAGNOSIS — E66.01 CLASS 2 SEVERE OBESITY DUE TO EXCESS CALORIES WITH SERIOUS COMORBIDITY AND BODY MASS INDEX (BMI) OF 35.0 TO 35.9 IN ADULT (MULTI): ICD-10-CM

## 2024-07-12 DIAGNOSIS — K21.9 GASTROESOPHAGEAL REFLUX DISEASE WITHOUT ESOPHAGITIS: ICD-10-CM

## 2024-07-12 DIAGNOSIS — E79.0 HYPERURICEMIA: ICD-10-CM

## 2024-07-12 DIAGNOSIS — N40.0 BENIGN PROSTATIC HYPERPLASIA WITHOUT LOWER URINARY TRACT SYMPTOMS: ICD-10-CM

## 2024-07-12 DIAGNOSIS — Z00.00 ROUTINE GENERAL MEDICAL EXAMINATION AT HEALTH CARE FACILITY: Primary | ICD-10-CM

## 2024-07-12 DIAGNOSIS — E78.2 MIXED HYPERLIPIDEMIA: ICD-10-CM

## 2024-07-12 PROBLEM — E66.812 CLASS 2 SEVERE OBESITY DUE TO EXCESS CALORIES WITH SERIOUS COMORBIDITY AND BODY MASS INDEX (BMI) OF 35.0 TO 35.9 IN ADULT: Status: ACTIVE | Noted: 2024-07-12

## 2024-07-12 LAB
CREAT UR STRIP-MCNC: 300 MG/DL
MICROALBUMIN UR TEST STR-MCNC: 80 MG/L
PROT/CREAT UR: ABNORMAL UG/MG CREAT

## 2024-07-12 PROCEDURE — 85027 COMPLETE CBC AUTOMATED: CPT

## 2024-07-12 PROCEDURE — G0444 DEPRESSION SCREEN ANNUAL: HCPCS | Performed by: INTERNAL MEDICINE

## 2024-07-12 PROCEDURE — 82043 UR ALBUMIN QUANTITATIVE: CPT | Mod: CLIA WAIVED TEST | Performed by: INTERNAL MEDICINE

## 2024-07-12 PROCEDURE — 84153 ASSAY OF PSA TOTAL: CPT

## 2024-07-12 PROCEDURE — 1170F FXNL STATUS ASSESSED: CPT | Performed by: INTERNAL MEDICINE

## 2024-07-12 PROCEDURE — G0439 PPPS, SUBSEQ VISIT: HCPCS | Performed by: INTERNAL MEDICINE

## 2024-07-12 PROCEDURE — 1160F RVW MEDS BY RX/DR IN RCRD: CPT | Performed by: INTERNAL MEDICINE

## 2024-07-12 PROCEDURE — 3079F DIAST BP 80-89 MM HG: CPT | Performed by: INTERNAL MEDICINE

## 2024-07-12 PROCEDURE — 99214 OFFICE O/P EST MOD 30 MIN: CPT | Performed by: INTERNAL MEDICINE

## 2024-07-12 PROCEDURE — 3075F SYST BP GE 130 - 139MM HG: CPT | Performed by: INTERNAL MEDICINE

## 2024-07-12 PROCEDURE — G0447 BEHAVIOR COUNSEL OBESITY 15M: HCPCS | Performed by: INTERNAL MEDICINE

## 2024-07-12 PROCEDURE — 36415 COLL VENOUS BLD VENIPUNCTURE: CPT

## 2024-07-12 PROCEDURE — 1036F TOBACCO NON-USER: CPT | Performed by: INTERNAL MEDICINE

## 2024-07-12 PROCEDURE — 80061 LIPID PANEL: CPT

## 2024-07-12 PROCEDURE — 1159F MED LIST DOCD IN RCRD: CPT | Performed by: INTERNAL MEDICINE

## 2024-07-12 PROCEDURE — 84550 ASSAY OF BLOOD/URIC ACID: CPT

## 2024-07-12 PROCEDURE — 1123F ACP DISCUSS/DSCN MKR DOCD: CPT | Performed by: INTERNAL MEDICINE

## 2024-07-12 PROCEDURE — G0446 INTENS BEHAVE THER CARDIO DX: HCPCS | Performed by: INTERNAL MEDICINE

## 2024-07-12 PROCEDURE — 80053 COMPREHEN METABOLIC PANEL: CPT

## 2024-07-12 PROCEDURE — 3008F BODY MASS INDEX DOCD: CPT | Performed by: INTERNAL MEDICINE

## 2024-07-12 PROCEDURE — 82570 ASSAY OF URINE CREATININE: CPT | Mod: CLIA WAIVED TEST | Performed by: INTERNAL MEDICINE

## 2024-07-12 RX ORDER — PANTOPRAZOLE SODIUM 40 MG/1
40 TABLET, DELAYED RELEASE ORAL DAILY PRN
Qty: 180 TABLET | Refills: 3 | Status: SHIPPED | OUTPATIENT
Start: 2024-07-12 | End: 2025-07-12

## 2024-07-12 ASSESSMENT — ENCOUNTER SYMPTOMS
DEPRESSION: 0
OCCASIONAL FEELINGS OF UNSTEADINESS: 1
LOSS OF SENSATION IN FEET: 0

## 2024-07-12 ASSESSMENT — ACTIVITIES OF DAILY LIVING (ADL)
GROCERY_SHOPPING: INDEPENDENT
BATHING: INDEPENDENT
DRESSING: INDEPENDENT
MANAGING_FINANCES: INDEPENDENT
DOING_HOUSEWORK: INDEPENDENT
TAKING_MEDICATION: INDEPENDENT

## 2024-07-12 ASSESSMENT — PATIENT HEALTH QUESTIONNAIRE - PHQ9
1. LITTLE INTEREST OR PLEASURE IN DOING THINGS: NOT AT ALL
2. FEELING DOWN, DEPRESSED OR HOPELESS: NOT AT ALL
SUM OF ALL RESPONSES TO PHQ9 QUESTIONS 1 AND 2: 0

## 2024-07-12 NOTE — PROGRESS NOTES
Subjective   John Allen is a 73 y.o. male who presents for Medicare Annual Wellness Visit Subsequent.  HPI  Past medical history includes hypertension, CKD G3a/A2, hyperlipidemia, obesity, gastritis and PUD, and arthritic problems.    [place] visit in [place] with epigastric symptoms.  Labs unremarkable, no acute issues on CT.  Findings included diverticulosis, right renal calculi, hepatic steatosis, simple renal cysts, right hernia, and enlarged prostate, 5.6cm across.     Occasional vertigo, has had vestibular rehab in the past; ever since cochlear surgery.  More recently getting it while trying to read/focus on nearby object, not necessarily with movement.     He also has a lot of arthritic issues, he has had bilateral shoulder replacement, says he also needs bilateral knee replacements but trying to avoid, coping.  Also with hx of sciatic pain.     Previously re: abdominal pain, saw Dr. Ortiz who confirmed inguinal hernia, but thought pain was MSK and likely unrelated to hernia. Offered elective if preferred. Not a concern.    No urinary changes or concerns.  Occasionally with delayed stream, not bothersome.     He is , goes to the gym fairly regularly, remote smoker.   Exercising regularly, weights, aerobics 4 times per week.           Active Problems  Problems    · Abdominal pain, epigastric (789.06) (R10.13)   · Benign enlargement of prostate (600.00) (N40.0)   · CKD stage G3b/A2, GFR 30-44 and albumin creatinine ratio  mg/g (585.3)  (N18.32)   · Class 2 obesity due to excess calories with body mass index (BMI) of 35.0 to 35.9 in  adult (278.00,V85.35) (E66.09,Z68.35)   · Elevated serum cholesterol (272.9) (E78.9)   · Esophageal reflux (530.81) (K21.9)   · Gastritis, chronic (535.10) (K29.50)   · Gout (274.9) (M10.9)   · Hemorrhoids (455.6) (K64.9)   · Hypertension (401.9) (I10)   · Added by Problem List Migration; 2013-8-25   · Irregular bowel habits (569.89) (R19.8)   · Knee pain (719.46)  (M25.569)   · Left inguinal hernia (550.90) (K40.90)   · Left sciatic nerve pain (724.3) (M54.32)   · Nausea in adult (787.02) (R11.0)   · Osteoarthrosis, shoulder region (715.91) (M19.019)   · Palpitations (785.1) (R00.2)   · Primary osteoarthritis of both hips (715.15) (M16.0)   · Primary osteoarthritis of left knee (715.16) (M17.12)   · Right kidney stone (592.0) (N20.0)   · Seborrheic keratoses (702.19) (L82.1)   · Sensorineural hearing loss, bilateral (389.18) (H90.3)   · Trigger ring finger of left hand (727.03) (M65.342)   · Vitamin D deficiency (268.9) (E55.9)     Past Medical History  Problems    · History of Dysuria (788.1) (R30.0)   · History of Encounter for vaccination (V05.9) (Z23)   · Resolved Date: 20 May 2017   · History of Hearing loss, bilateral (389.9) (H91.93)   · History of abdominal pain (V13.89) (Z87.898)   · Resolved Date: 27 Apr 2016   · History of diarrhea (V12.79) (Z87.898)   · Resolved Date: 20 Nov 2017   · History of gastric ulcer (V12.79) (Z87.11)   · History of Pre-operative exam (V72.84) (Z01.818)   · Resolved Date: 20 May 2017   · History of Screening for abdominal aortic aneurysm (V81.2) (Z13.6)   · Resolved Date: 20 May 2017     Surgical History  Problems    · History of Arthroscopy Knee Left   · History of Arthroscopy Knee Right   · History of Cochlear implant surgery   · History of Complete Colonoscopy   · Dr. Farhat Carrasco 1/31/2012 repeat in 5 years   · History of Umbilical hernia repair     Family History  Mother    · Family history of Lung Cancer (V16.1)  Father    · Denied: Family history of Acute Myocardial Infarction   · Family history of Colon Cancer (V16.0)  Maternal Grandmother    · Family history of Diabetes Mellitus (V18.0)  Maternal Grandfather    · Family history of Acute Myocardial Infarction (V17.3)     Social History  Problems    · Former smoker (V15.82) (Z87.891)   · stopped 40 yrs ago   · Marital History - Currently    · Never Drank Alcohol   · Working  Part-time              Objective   /83 (BP Location: Right arm, Patient Position: Sitting, BP Cuff Size: Adult long)   Pulse 73   Ht 1.829 m (6')   Wt 117 kg (259 lb)   SpO2 94%   BMI 35.13 kg/m²    Physical Exam  Gen: NAD, pleasant, A&Ox3  HEENT: PERRL, EOMI, MMM, OP clear  Neck: supple, no thyromegaly, no JVD, normal carotid upstroke  Pulm: lungs CTAB, good air movement  CV: RRR, no m/r/g, 2+ DP pulses  Abd: NABS, soft, mild epigastric discomfort, ND no HSM  [: normal male anatomy, left inguinal hernia, no perirectal pathology, normal tone, smooth, non-nodular prostate ~25g]  Ext: no peripheral edema  Neuro: CN II-XII intact, no focal sensory or motor deficits, normal reflexes  Skin: Scattered benign lesions including seborrheic keratoses and angiomas  Assessment/Plan      Constipation with intermittent pain: recommend daily fiber supplementation (Metamucil); very happy with regularity since starting, continue    Obesity: considering GLP-1, cost an issue, not covered     GERD/cough:   -Continue pantoprazole prn, famotidine daily; f/u with GI prn  - also saw ENT on 10/19/2023, Dr. Diamond, who did laryngoscopy and suspected LPR and recommended using PPI   - EGD, 5/16/2024, with small hiatal hernia and gastritis, no H. pylori     Inguinal hernia: Referred to general surgery, can return on elective basis currently asymptomatic     Hypertension/CKD G3a/A2:   - improved albuminuria blood pressure control since starting losartan 25 mg, continue  - unremarkable EKG, JUL 2022; off beta-blocker without change in pattern of palpitations, minimally symptomatic    Hyperlipidemia: Continue atorvastatin 20mg     Hx of gout: recheck uric acid levels, cont allopurinol     Health maintenance  -Last colonoscopy: 5/14/2020, 1 HP Repeat: 5 years, given family hx  -PSA: 2.03 ng/mL JUL 2022  -Smoking history: Remote smoker, reports negative AAA  -Counseled regarding diet and exercise  -Immunizations: UTD  - annual derm  visit, saw in JUN 2024  -Followup in 6-12 months, sooner if needed  Problem List Items Addressed This Visit    None    5-10 minutes were spent on screening for Depression.  BMI was above normal measurement. Current weight: 117 kg (259 lb)  Weight change since last visit (-) denotes wt loss 2 lbs   Weight loss needed to achieve BMI 25: 75.1 Lbs  Weight loss needed to achieve BMI 30: 38.3 Lbs  Provided instructions on dietary changes  Provided instructions on exercise  Advised to Increase physical activity  Considering GLP-1 RA therapy .  >15 minutes spent counseling  The 10-year ASCVD risk score (Cl BETANCOURT, et al., 2019) is: 27.4%    Values used to calculate the score:      Age: 73 years      Sex: Male      Is Non- : No      Diabetic: No      Tobacco smoker: No      Systolic Blood Pressure: 134 mmHg      Is BP treated: Yes      HDL Cholesterol: 40.9 mg/dL      Total Cholesterol: 171 mg/dL  Cardiovascular risk discussed and modifiable risk factors addressed.  Discussion included options of pharmaceutical interventions and recommended lifestyle modifications, including nutritional choices, exercise, and elimination of habits contributing to risk.   >15 minutes spent on assessment and discussion.         Alejandro Argueta MD

## 2024-07-13 LAB
ALBUMIN SERPL BCP-MCNC: 4.4 G/DL (ref 3.4–5)
ALP SERPL-CCNC: 69 U/L (ref 33–136)
ALT SERPL W P-5'-P-CCNC: 20 U/L (ref 10–52)
ANION GAP SERPL CALC-SCNC: 19 MMOL/L (ref 10–20)
AST SERPL W P-5'-P-CCNC: 21 U/L (ref 9–39)
BILIRUB SERPL-MCNC: 1 MG/DL (ref 0–1.2)
BUN SERPL-MCNC: 20 MG/DL (ref 6–23)
CALCIUM SERPL-MCNC: 9.7 MG/DL (ref 8.6–10.6)
CHLORIDE SERPL-SCNC: 105 MMOL/L (ref 98–107)
CHOLEST SERPL-MCNC: 156 MG/DL (ref 0–199)
CHOLESTEROL/HDL RATIO: 3.9
CO2 SERPL-SCNC: 23 MMOL/L (ref 21–32)
CREAT SERPL-MCNC: 1.54 MG/DL (ref 0.5–1.3)
EGFRCR SERPLBLD CKD-EPI 2021: 47 ML/MIN/1.73M*2
ERYTHROCYTE [DISTWIDTH] IN BLOOD BY AUTOMATED COUNT: 13.8 % (ref 11.5–14.5)
GLUCOSE SERPL-MCNC: 81 MG/DL (ref 74–99)
HCT VFR BLD AUTO: 40.1 % (ref 41–52)
HDLC SERPL-MCNC: 40 MG/DL
HGB BLD-MCNC: 12.9 G/DL (ref 13.5–17.5)
LDLC SERPL CALC-MCNC: 82 MG/DL
MCH RBC QN AUTO: 30.4 PG (ref 26–34)
MCHC RBC AUTO-ENTMCNC: 32.2 G/DL (ref 32–36)
MCV RBC AUTO: 94 FL (ref 80–100)
NON HDL CHOLESTEROL: 116 MG/DL (ref 0–149)
NRBC BLD-RTO: 0 /100 WBCS (ref 0–0)
PLATELET # BLD AUTO: 168 X10*3/UL (ref 150–450)
POTASSIUM SERPL-SCNC: 4.8 MMOL/L (ref 3.5–5.3)
PROT SERPL-MCNC: 7.2 G/DL (ref 6.4–8.2)
RBC # BLD AUTO: 4.25 X10*6/UL (ref 4.5–5.9)
SODIUM SERPL-SCNC: 142 MMOL/L (ref 136–145)
TRIGL SERPL-MCNC: 169 MG/DL (ref 0–149)
URATE SERPL-MCNC: 6.2 MG/DL (ref 4–7.5)
VLDL: 34 MG/DL (ref 0–40)
WBC # BLD AUTO: 5.9 X10*3/UL (ref 4.4–11.3)

## 2024-07-15 LAB — PSA SERPL-MCNC: 2.82 NG/ML

## 2024-08-23 DIAGNOSIS — I10 ESSENTIAL HYPERTENSION: ICD-10-CM

## 2024-08-23 DIAGNOSIS — N18.32 CKD STAGE G3B/A2, GFR 30-44 AND ALBUMIN CREATININE RATIO 30-299 MG/G (MULTI): ICD-10-CM

## 2024-08-26 RX ORDER — LOSARTAN POTASSIUM 25 MG/1
25 TABLET ORAL DAILY
Qty: 90 TABLET | Refills: 3 | Status: SHIPPED | OUTPATIENT
Start: 2024-08-26

## 2024-09-02 DIAGNOSIS — E78.2 MIXED HYPERLIPIDEMIA: ICD-10-CM

## 2024-09-04 ENCOUNTER — PATIENT MESSAGE (OUTPATIENT)
Dept: PRIMARY CARE | Facility: CLINIC | Age: 74
End: 2024-09-04
Payer: MEDICARE

## 2024-09-04 DIAGNOSIS — G47.62 NOCTURNAL LEG CRAMPS: Primary | ICD-10-CM

## 2024-09-04 RX ORDER — ATORVASTATIN CALCIUM 20 MG/1
20 TABLET, FILM COATED ORAL DAILY
Qty: 90 TABLET | Refills: 3 | Status: SHIPPED | OUTPATIENT
Start: 2024-09-04

## 2024-11-27 DIAGNOSIS — K21.9 GASTROESOPHAGEAL REFLUX DISEASE WITHOUT ESOPHAGITIS: ICD-10-CM

## 2024-12-02 DIAGNOSIS — K21.9 GASTROESOPHAGEAL REFLUX DISEASE WITHOUT ESOPHAGITIS: ICD-10-CM

## 2024-12-02 RX ORDER — FAMOTIDINE 20 MG/1
20 TABLET, FILM COATED ORAL NIGHTLY
Qty: 90 TABLET | Refills: 3 | Status: SHIPPED | OUTPATIENT
Start: 2024-12-02 | End: 2024-12-02 | Stop reason: SDUPTHER

## 2024-12-03 RX ORDER — FAMOTIDINE 20 MG/1
20 TABLET, FILM COATED ORAL NIGHTLY
Qty: 90 TABLET | Refills: 3 | Status: SHIPPED | OUTPATIENT
Start: 2024-12-03 | End: 2024-12-05 | Stop reason: SDUPTHER

## 2024-12-05 DIAGNOSIS — K21.9 GASTROESOPHAGEAL REFLUX DISEASE WITHOUT ESOPHAGITIS: ICD-10-CM

## 2024-12-05 RX ORDER — FAMOTIDINE 20 MG/1
20 TABLET, FILM COATED ORAL NIGHTLY
Qty: 90 TABLET | Refills: 3 | Status: SHIPPED | OUTPATIENT
Start: 2024-12-05 | End: 2025-12-05

## 2024-12-12 ENCOUNTER — OFFICE VISIT (OUTPATIENT)
Dept: PRIMARY CARE | Facility: CLINIC | Age: 74
End: 2024-12-12
Payer: MEDICARE

## 2024-12-12 VITALS
OXYGEN SATURATION: 94 % | HEIGHT: 72 IN | WEIGHT: 257.1 LBS | SYSTOLIC BLOOD PRESSURE: 120 MMHG | HEART RATE: 96 BPM | BODY MASS INDEX: 34.82 KG/M2 | TEMPERATURE: 96.9 F | DIASTOLIC BLOOD PRESSURE: 77 MMHG

## 2024-12-12 DIAGNOSIS — N39.0 URINARY TRACT INFECTION WITHOUT HEMATURIA, SITE UNSPECIFIED: ICD-10-CM

## 2024-12-12 DIAGNOSIS — N41.0 ACUTE PROSTATITIS: ICD-10-CM

## 2024-12-12 DIAGNOSIS — M54.50 ACUTE LEFT-SIDED LOW BACK PAIN WITHOUT SCIATICA: Primary | ICD-10-CM

## 2024-12-12 DIAGNOSIS — R30.0 DYSURIA: ICD-10-CM

## 2024-12-12 LAB
APPEARANCE UR: ABNORMAL
BILIRUB UR QL STRIP: NEGATIVE
COLOR UR: ABNORMAL
GLUCOSE UR STRIP-MCNC: NEGATIVE MG/DL
HGB UR QL STRIP: ABNORMAL
KETONES UR STRIP-MCNC: NEGATIVE MG/DL
LEUKOCYTE ESTERASE UR QL STRIP: NEGATIVE
NITRITE UR QL STRIP: NEGATIVE
PH UR STRIP: 6 [PH]
PROT UR STRIP-MCNC: ABNORMAL MG/DL
SP GR UR STRIP.AUTO: >=1.03
UROBILINOGEN UR STRIP-ACNC: 0.2 E.U./DL

## 2024-12-12 PROCEDURE — G2211 COMPLEX E/M VISIT ADD ON: HCPCS | Performed by: INTERNAL MEDICINE

## 2024-12-12 PROCEDURE — 3078F DIAST BP <80 MM HG: CPT | Performed by: INTERNAL MEDICINE

## 2024-12-12 PROCEDURE — 81001 URINALYSIS AUTO W/SCOPE: CPT

## 2024-12-12 PROCEDURE — 87086 URINE CULTURE/COLONY COUNT: CPT

## 2024-12-12 PROCEDURE — 81003 URINALYSIS AUTO W/O SCOPE: CPT | Performed by: INTERNAL MEDICINE

## 2024-12-12 PROCEDURE — 3074F SYST BP LT 130 MM HG: CPT | Performed by: INTERNAL MEDICINE

## 2024-12-12 PROCEDURE — 1159F MED LIST DOCD IN RCRD: CPT | Performed by: INTERNAL MEDICINE

## 2024-12-12 PROCEDURE — 1123F ACP DISCUSS/DSCN MKR DOCD: CPT | Performed by: INTERNAL MEDICINE

## 2024-12-12 PROCEDURE — 3008F BODY MASS INDEX DOCD: CPT | Performed by: INTERNAL MEDICINE

## 2024-12-12 PROCEDURE — 99213 OFFICE O/P EST LOW 20 MIN: CPT | Performed by: INTERNAL MEDICINE

## 2024-12-12 PROCEDURE — 1036F TOBACCO NON-USER: CPT | Performed by: INTERNAL MEDICINE

## 2024-12-12 RX ORDER — SULFAMETHOXAZOLE AND TRIMETHOPRIM 800; 160 MG/1; MG/1
1 TABLET ORAL 2 TIMES DAILY
Qty: 28 TABLET | Refills: 0 | Status: CANCELLED | OUTPATIENT
Start: 2024-12-12 | End: 2024-12-26

## 2024-12-12 ASSESSMENT — ENCOUNTER SYMPTOMS
LOSS OF SENSATION IN FEET: 0
OCCASIONAL FEELINGS OF UNSTEADINESS: 0
DEPRESSION: 0

## 2024-12-12 NOTE — PROGRESS NOTES
Subjective   Patient ID: John Allen is a 74 y.o. male who presents for Sick Visit (Possible uti).    Acute visit for mild but persistent symptoms starting about a week and a half ago.    Noticed sensation of some mild tenderness almost as if there was swelling in the area of the prostate, more notable when sitting.  I was followed by some mild tingling sensation in the urethra which is fairly stable.  There is no increased symptoms with urination, no dysuria, increased frequency or urgency, changes in color or hematuria.  He did experience some spasms in the area of the prostate.  Has not had any fevers chills or sweats, feels well otherwise.  Occasionally has discomfort in that area with constipation.    He has a history of diverticulosis without diverticulitis.  He has a history of nephrolithiasis without symptomatic kidney stone disease.  Also reports a history of prostatitis and UTI in the past but not recently.    Objective   Physical Exam    /77 (BP Location: Left arm, Patient Position: Sitting, BP Cuff Size: Adult)   Pulse 96   Temp 36.1 °C (96.9 °F) (Temporal)   Ht 1.829 m (6')   Wt 117 kg (257 lb 1.6 oz)   SpO2 94%   BMI 34.87 kg/m²    Appears well, nontoxic, no discomfort  Normal oropharynx, neck supple no lymphadenopathy  Regular rate and rhythm  Lungs clear  NABS, soft, nontender, nondistended, no CVA tenderness to percussion  Normal circumcised male anatomy, no visible urethral erythema or drainage  No perirectal disease, some tenderness in the perineum without any palpable masses swelling warmth or erythema.  Normal tone, prostate is nontender, uniform, no nodules.    Assessment/Plan     Undifferentiated pelvic symptoms: May be related to musculoskeletal versus connective tissue, consider diverticulitis, prostatitis, urinary tract infection or symptomatic nephrolithiasis.    Discussed options and agreed to monitor symptoms, send off microscopic urinalysis with culture reflexed.  If he  develops worsening symptoms or fevers will treat empirically with Bactrim which would be an appropriate therapy for prostatitis or diverticulitis as well as UTI.      Alejandro Argueta MD

## 2024-12-13 LAB
APPEARANCE UR: CLEAR
BILIRUB UR STRIP.AUTO-MCNC: NEGATIVE MG/DL
COLOR UR: YELLOW
GLUCOSE UR STRIP.AUTO-MCNC: NORMAL MG/DL
HOLD SPECIMEN: NORMAL
KETONES UR STRIP.AUTO-MCNC: NEGATIVE MG/DL
LEUKOCYTE ESTERASE UR QL STRIP.AUTO: ABNORMAL
MUCOUS THREADS #/AREA URNS AUTO: ABNORMAL /LPF
NITRITE UR QL STRIP.AUTO: NEGATIVE
PH UR STRIP.AUTO: 5.5 [PH]
PROT UR STRIP.AUTO-MCNC: ABNORMAL MG/DL
RBC # UR STRIP.AUTO: NEGATIVE /UL
RBC #/AREA URNS AUTO: ABNORMAL /HPF
SP GR UR STRIP.AUTO: 1.02
UROBILINOGEN UR STRIP.AUTO-MCNC: NORMAL MG/DL
WBC #/AREA URNS AUTO: ABNORMAL /HPF
WBC CLUMPS #/AREA URNS AUTO: ABNORMAL /HPF

## 2024-12-15 LAB — BACTERIA UR CULT: NO GROWTH

## 2025-01-15 ENCOUNTER — APPOINTMENT (OUTPATIENT)
Dept: PRIMARY CARE | Facility: CLINIC | Age: 75
End: 2025-01-15
Payer: MEDICARE

## 2025-01-15 VITALS
WEIGHT: 262.7 LBS | DIASTOLIC BLOOD PRESSURE: 74 MMHG | TEMPERATURE: 97.2 F | OXYGEN SATURATION: 94 % | SYSTOLIC BLOOD PRESSURE: 117 MMHG | HEART RATE: 71 BPM | BODY MASS INDEX: 35.58 KG/M2 | HEIGHT: 72 IN

## 2025-01-15 DIAGNOSIS — K57.90 DIVERTICULOSIS: ICD-10-CM

## 2025-01-15 DIAGNOSIS — E78.2 MIXED HYPERLIPIDEMIA: Primary | ICD-10-CM

## 2025-01-15 DIAGNOSIS — I10 ESSENTIAL HYPERTENSION: ICD-10-CM

## 2025-01-15 DIAGNOSIS — N18.32 CKD STAGE G3B/A2, GFR 30-44 AND ALBUMIN CREATININE RATIO 30-299 MG/G (MULTI): ICD-10-CM

## 2025-01-15 DIAGNOSIS — E66.812 CLASS 2 SEVERE OBESITY DUE TO EXCESS CALORIES WITH SERIOUS COMORBIDITY AND BODY MASS INDEX (BMI) OF 35.0 TO 35.9 IN ADULT: ICD-10-CM

## 2025-01-15 DIAGNOSIS — E66.01 CLASS 2 SEVERE OBESITY DUE TO EXCESS CALORIES WITH SERIOUS COMORBIDITY AND BODY MASS INDEX (BMI) OF 35.0 TO 35.9 IN ADULT: ICD-10-CM

## 2025-01-15 LAB
BASOPHILS # BLD AUTO: 0.04 X10*3/UL (ref 0–0.1)
BASOPHILS NFR BLD AUTO: 0.6 %
EOSINOPHIL # BLD AUTO: 0.1 X10*3/UL (ref 0–0.4)
EOSINOPHIL NFR BLD AUTO: 1.5 %
ERYTHROCYTE [DISTWIDTH] IN BLOOD BY AUTOMATED COUNT: 13.2 % (ref 11.5–14.5)
HCT VFR BLD AUTO: 40.5 % (ref 41–52)
HGB BLD-MCNC: 13.2 G/DL (ref 13.5–17.5)
IMM GRANULOCYTES # BLD AUTO: 0.02 X10*3/UL (ref 0–0.5)
IMM GRANULOCYTES NFR BLD AUTO: 0.3 % (ref 0–0.9)
LYMPHOCYTES # BLD AUTO: 2.19 X10*3/UL (ref 0.8–3)
LYMPHOCYTES NFR BLD AUTO: 32.4 %
MCH RBC QN AUTO: 30.3 PG (ref 26–34)
MCHC RBC AUTO-ENTMCNC: 32.6 G/DL (ref 32–36)
MCV RBC AUTO: 93 FL (ref 80–100)
MONOCYTES # BLD AUTO: 0.8 X10*3/UL (ref 0.05–0.8)
MONOCYTES NFR BLD AUTO: 11.9 %
NEUTROPHILS # BLD AUTO: 3.6 X10*3/UL (ref 1.6–5.5)
NEUTROPHILS NFR BLD AUTO: 53.3 %
NRBC BLD-RTO: 0 /100 WBCS (ref 0–0)
PLATELET # BLD AUTO: 174 X10*3/UL (ref 150–450)
RBC # BLD AUTO: 4.36 X10*6/UL (ref 4.5–5.9)
WBC # BLD AUTO: 6.8 X10*3/UL (ref 4.4–11.3)

## 2025-01-15 PROCEDURE — 90673 RIV3 VACCINE NO PRESERV IM: CPT | Performed by: INTERNAL MEDICINE

## 2025-01-15 PROCEDURE — 80061 LIPID PANEL: CPT

## 2025-01-15 PROCEDURE — G2211 COMPLEX E/M VISIT ADD ON: HCPCS | Performed by: INTERNAL MEDICINE

## 2025-01-15 PROCEDURE — 1159F MED LIST DOCD IN RCRD: CPT | Performed by: INTERNAL MEDICINE

## 2025-01-15 PROCEDURE — 80053 COMPREHEN METABOLIC PANEL: CPT

## 2025-01-15 PROCEDURE — 85025 COMPLETE CBC W/AUTO DIFF WBC: CPT

## 2025-01-15 PROCEDURE — 3078F DIAST BP <80 MM HG: CPT | Performed by: INTERNAL MEDICINE

## 2025-01-15 PROCEDURE — 99214 OFFICE O/P EST MOD 30 MIN: CPT | Performed by: INTERNAL MEDICINE

## 2025-01-15 PROCEDURE — 3008F BODY MASS INDEX DOCD: CPT | Performed by: INTERNAL MEDICINE

## 2025-01-15 PROCEDURE — 3074F SYST BP LT 130 MM HG: CPT | Performed by: INTERNAL MEDICINE

## 2025-01-15 PROCEDURE — G0008 ADMIN INFLUENZA VIRUS VAC: HCPCS | Performed by: INTERNAL MEDICINE

## 2025-01-15 PROCEDURE — 1036F TOBACCO NON-USER: CPT | Performed by: INTERNAL MEDICINE

## 2025-01-15 PROCEDURE — 1123F ACP DISCUSS/DSCN MKR DOCD: CPT | Performed by: INTERNAL MEDICINE

## 2025-01-15 ASSESSMENT — ENCOUNTER SYMPTOMS
OCCASIONAL FEELINGS OF UNSTEADINESS: 0
LOSS OF SENSATION IN FEET: 0
DEPRESSION: 0

## 2025-01-15 NOTE — PROGRESS NOTES
Subjective   John Allen is a 74 y.o. male who presents for Follow-up.  HPI  Past medical history includes hypertension, CKD G3a/A2, hyperlipidemia, obesity, gastritis and PUD, and arthritic problems.    Urinary symptoms resolved within a few days.  For the past few months he has been having left lower quadrant pressure, feels like there is almost something growing inside his lower abdomen, can be quite uncomfortable and even painful but typically only lasts 30 minutes or so.  Seems to resolve with having a few bowel movements.  No other changes to bowel habits, has been fairly regular since being on Metamucil, no blood in the stool, occurs maybe once or twice a month at most.  Not associated with straining or lifting anything.  Previously re: abdominal pain, saw Dr. Ortiz who confirmed inguinal hernia, but thought pain was MSK and likely unrelated to hernia. Offered elective if preferred. Not a concern.    He did have a CT scan in 2024: Findings included diverticulosis, right renal calculi, hepatic steatosis, simple renal cysts, right hernia, and enlarged prostate, 5.6cm across.     Occasional vertigo, has had vestibular rehab in the past; ever since cochlear surgery.  More recently getting it while trying to read/focus on nearby object, not necessarily with movement.     He also has a lot of arthritic issues, he has had bilateral shoulder replacement, says he also needs bilateral knee replacements but trying to avoid, coping.  Also with hx of sciatic pain.       No urinary changes or concerns.  Occasionally with delayed stream, not bothersome.     He is , goes to the gym fairly regularly, remote smoker.   Exercising regularly, weights, aerobics 4 times per week.           Active Problems  Problems    · Abdominal pain, epigastric (789.06) (R10.13)   · Benign enlargement of prostate (600.00) (N40.0)   · CKD stage G3b/A2, GFR 30-44 and albumin creatinine ratio  mg/g (585.3)  (N18.32)   · Class 2  obesity due to excess calories with body mass index (BMI) of 35.0 to 35.9 in  adult (278.00,V85.35) (E66.09,Z68.35)   · Elevated serum cholesterol (272.9) (E78.9)   · Esophageal reflux (530.81) (K21.9)   · Gastritis, chronic (535.10) (K29.50)   · Gout (274.9) (M10.9)   · Hemorrhoids (455.6) (K64.9)   · Hypertension (401.9) (I10)   · Added by Problem List Migration; 2013-8-25   · Irregular bowel habits (569.89) (R19.8)   · Knee pain (719.46) (M25.569)   · Left inguinal hernia (550.90) (K40.90)   · Left sciatic nerve pain (724.3) (M54.32)   · Nausea in adult (787.02) (R11.0)   · Osteoarthrosis, shoulder region (715.91) (M19.019)   · Palpitations (785.1) (R00.2)   · Primary osteoarthritis of both hips (715.15) (M16.0)   · Primary osteoarthritis of left knee (715.16) (M17.12)   · Right kidney stone (592.0) (N20.0)   · Seborrheic keratoses (702.19) (L82.1)   · Sensorineural hearing loss, bilateral (389.18) (H90.3)   · Trigger ring finger of left hand (727.03) (M65.342)   · Vitamin D deficiency (268.9) (E55.9)     Past Medical History  Problems    · History of Dysuria (788.1) (R30.0)   · History of Encounter for vaccination (V05.9) (Z23)   · Resolved Date: 20 May 2017   · History of Hearing loss, bilateral (389.9) (H91.93)   · History of abdominal pain (V13.89) (Z87.898)   · Resolved Date: 27 Apr 2016   · History of diarrhea (V12.79) (Z87.898)   · Resolved Date: 20 Nov 2017   · History of gastric ulcer (V12.79) (Z87.11)   · History of Pre-operative exam (V72.84) (Z01.818)   · Resolved Date: 20 May 2017   · History of Screening for abdominal aortic aneurysm (V81.2) (Z13.6)   · Resolved Date: 20 May 2017     Surgical History  Problems    · History of Arthroscopy Knee Left   · History of Arthroscopy Knee Right   · History of Cochlear implant surgery   · History of Complete Colonoscopy   · Dr. Farhat Carrasco 1/31/2012 repeat in 5 years   · History of Umbilical hernia repair     Family History  Mother    · Family history of  Lung Cancer (V16.1)  Father    · Denied: Family history of Acute Myocardial Infarction   · Family history of Colon Cancer (V16.0)  Maternal Grandmother    · Family history of Diabetes Mellitus (V18.0)  Maternal Grandfather    · Family history of Acute Myocardial Infarction (V17.3)     Social History  Problems    · Former smoker (V15.82) (Z87.891)   · stopped 40 yrs ago   · Marital History - Currently    · Never Drank Alcohol   · Working Part-time              Objective   /74 (BP Location: Right arm, Patient Position: Sitting, BP Cuff Size: Adult)   Pulse 71   Temp 36.2 °C (97.2 °F) (Temporal)   Ht 1.829 m (6')   Wt 119 kg (262 lb 11.2 oz)   SpO2 94%   BMI 35.63 kg/m²    Physical Exam  Gen: NAD, pleasant, A&Ox3  HEENT: PERRL, EOMI, MMM, OP clear  Neck: supple, no thyromegaly, no JVD, normal carotid upstroke  Pulm: lungs CTAB, good air movement  CV: RRR, no m/r/g, 2+ DP pulses  Abd: NABS, soft, mild epigastric discomfort, ND no HSM  [: normal male anatomy, left inguinal hernia, no perirectal pathology, normal tone, smooth, non-nodular prostate ~25g]  Ext: no peripheral edema  Neuro: CN II-XII intact, no focal sensory or motor deficits, normal reflexes  Skin: Scattered benign lesions including seborrheic keratoses and angiomas  Assessment/Plan      Constipation with intermittent pain: recommend daily fiber supplementation (Metamucil); very happy with regularity since starting, continue; monitor symptoms, likely related to constipation, given low frequency and brief episodes likely benign, reassured    Obesity: considering GLP-1, cost an issue, not covered     GERD/cough:   -Continue pantoprazole prn, famotidine daily; f/u with GI prn  - also saw ENT on 10/19/2023, Dr. Diamond, who did laryngoscopy and suspected LPR and recommended using PPI   - EGD, 5/16/2024, with small hiatal hernia and gastritis, no H. pylori     Inguinal hernia: Referred to general surgery in 2019, can return on elective basis  currently asymptomatic     Hypertension/CKD G3a/A2:   - improved albuminuria blood pressure control since starting losartan 25 mg, continue  - unremarkable EKG, JUL 2022; off beta-blocker without change in pattern of palpitations, minimally symptomatic    Hyperlipidemia: Continue atorvastatin 20mg     Hx of gout: recheck uric acid levels, cont allopurinol     Health maintenance  -Last colonoscopy: 5/14/2020, 1 HP Repeat: 5 years, given family hx  -PSA: 2.03 ng/mL JUL 2022  -Smoking history: Remote smoker, reports negative AAA  -Counseled regarding diet and exercise  -Immunizations: UTD  - annual derm visit, saw in JUN 2024  -Followup in 6-12 months, sooner if needed  Problem List Items Addressed This Visit    None    Alejandro Argueta MD

## 2025-01-16 LAB
ALBUMIN SERPL BCP-MCNC: 4.4 G/DL (ref 3.4–5)
ALP SERPL-CCNC: 69 U/L (ref 33–136)
ALT SERPL W P-5'-P-CCNC: 18 U/L (ref 10–52)
ANION GAP SERPL CALC-SCNC: 16 MMOL/L (ref 10–20)
AST SERPL W P-5'-P-CCNC: 14 U/L (ref 9–39)
BILIRUB SERPL-MCNC: 1 MG/DL (ref 0–1.2)
BUN SERPL-MCNC: 30 MG/DL (ref 6–23)
CALCIUM SERPL-MCNC: 9.6 MG/DL (ref 8.6–10.6)
CHLORIDE SERPL-SCNC: 104 MMOL/L (ref 98–107)
CHOLEST SERPL-MCNC: 163 MG/DL (ref 0–199)
CHOLESTEROL/HDL RATIO: 4.1
CO2 SERPL-SCNC: 26 MMOL/L (ref 21–32)
CREAT SERPL-MCNC: 1.73 MG/DL (ref 0.5–1.3)
EGFRCR SERPLBLD CKD-EPI 2021: 41 ML/MIN/1.73M*2
GLUCOSE SERPL-MCNC: 85 MG/DL (ref 74–99)
HDLC SERPL-MCNC: 39.6 MG/DL
LDLC SERPL CALC-MCNC: 88 MG/DL
NON HDL CHOLESTEROL: 123 MG/DL (ref 0–149)
POTASSIUM SERPL-SCNC: 4.6 MMOL/L (ref 3.5–5.3)
PROT SERPL-MCNC: 7.4 G/DL (ref 6.4–8.2)
SODIUM SERPL-SCNC: 141 MMOL/L (ref 136–145)
TRIGL SERPL-MCNC: 177 MG/DL (ref 0–149)
VLDL: 35 MG/DL (ref 0–40)

## 2025-02-12 DIAGNOSIS — Z12.11 COLON CANCER SCREENING: ICD-10-CM

## 2025-02-12 RX ORDER — POLYETHYLENE GLYCOL 3350, SODIUM SULFATE ANHYDROUS, SODIUM BICARBONATE, SODIUM CHLORIDE, POTASSIUM CHLORIDE 236; 22.74; 6.74; 5.86; 2.97 G/4L; G/4L; G/4L; G/4L; G/4L
POWDER, FOR SOLUTION ORAL
Qty: 4000 ML | Refills: 0 | Status: SHIPPED | OUTPATIENT
Start: 2025-02-12

## 2025-03-06 DIAGNOSIS — M10.9 GOUT, UNSPECIFIED CAUSE, UNSPECIFIED CHRONICITY, UNSPECIFIED SITE: ICD-10-CM

## 2025-03-07 RX ORDER — ALLOPURINOL 300 MG/1
300 TABLET ORAL DAILY
Qty: 90 TABLET | Refills: 3 | Status: SHIPPED | OUTPATIENT
Start: 2025-03-07

## 2025-04-16 ENCOUNTER — APPOINTMENT (OUTPATIENT)
Dept: GASTROENTEROLOGY | Facility: EXTERNAL LOCATION | Age: 75
End: 2025-04-16
Payer: MEDICARE

## 2025-04-16 DIAGNOSIS — Z12.11 SPECIAL SCREENING FOR MALIGNANT NEOPLASMS, COLON: Primary | ICD-10-CM

## 2025-04-16 DIAGNOSIS — D12.4 BENIGN NEOPLASM OF DESCENDING COLON: ICD-10-CM

## 2025-04-16 DIAGNOSIS — Z86.0100 PERSONAL HISTORY OF COLON POLYPS, UNSPECIFIED: ICD-10-CM

## 2025-04-16 DIAGNOSIS — Z86.0101 HISTORY OF ADENOMATOUS POLYP OF COLON: ICD-10-CM

## 2025-04-16 PROCEDURE — 88305 TISSUE EXAM BY PATHOLOGIST: CPT | Performed by: PATHOLOGY

## 2025-04-16 PROCEDURE — 0753T DGTZ GLS MCRSCP SLD LEVEL IV: CPT

## 2025-04-16 PROCEDURE — G0500 MOD SEDAT ENDO SERVICE >5YRS: HCPCS | Performed by: INTERNAL MEDICINE

## 2025-04-16 PROCEDURE — 45338 SIGMOIDOSCOPY W/TUMR REMOVE: CPT | Performed by: INTERNAL MEDICINE

## 2025-04-16 PROCEDURE — 88305 TISSUE EXAM BY PATHOLOGIST: CPT

## 2025-04-16 PROCEDURE — 1123F ACP DISCUSS/DSCN MKR DOCD: CPT | Performed by: INTERNAL MEDICINE

## 2025-04-16 NOTE — PROGRESS NOTES
Colonoscopy performed today 4/16/2025.  See procedure report(s) under media tab.    Patient had extreme pain in left inguinal region from scope insertion on despite large doses of Fentanyl and Versed so procedure was aborted. No externally visible light/herniation. Procedure terminated.    Will re-evaluate for colonoscopy after surgical evaluation of his hernia.

## 2025-04-17 ENCOUNTER — LAB REQUISITION (OUTPATIENT)
Dept: LAB | Facility: HOSPITAL | Age: 75
End: 2025-04-17
Payer: MEDICARE

## 2025-04-17 DIAGNOSIS — Z86.0100 PERSONAL HISTORY OF COLON POLYPS, UNSPECIFIED: ICD-10-CM

## 2025-04-22 ENCOUNTER — APPOINTMENT (OUTPATIENT)
Dept: SURGERY | Facility: CLINIC | Age: 75
End: 2025-04-22
Payer: MEDICARE

## 2025-04-22 VITALS
BODY MASS INDEX: 35.51 KG/M2 | WEIGHT: 261.8 LBS | SYSTOLIC BLOOD PRESSURE: 147 MMHG | TEMPERATURE: 97.7 F | DIASTOLIC BLOOD PRESSURE: 81 MMHG | HEART RATE: 76 BPM

## 2025-04-22 DIAGNOSIS — K40.91 UNILATERAL RECURRENT INGUINAL HERNIA WITHOUT OBSTRUCTION OR GANGRENE: Primary | ICD-10-CM

## 2025-04-22 PROCEDURE — 3077F SYST BP >= 140 MM HG: CPT | Performed by: SURGERY

## 2025-04-22 PROCEDURE — 1036F TOBACCO NON-USER: CPT | Performed by: SURGERY

## 2025-04-22 PROCEDURE — 99213 OFFICE O/P EST LOW 20 MIN: CPT | Performed by: SURGERY

## 2025-04-22 PROCEDURE — 1160F RVW MEDS BY RX/DR IN RCRD: CPT | Performed by: SURGERY

## 2025-04-22 PROCEDURE — 3079F DIAST BP 80-89 MM HG: CPT | Performed by: SURGERY

## 2025-04-22 PROCEDURE — 1126F AMNT PAIN NOTED NONE PRSNT: CPT | Performed by: SURGERY

## 2025-04-22 PROCEDURE — 1123F ACP DISCUSS/DSCN MKR DOCD: CPT | Performed by: SURGERY

## 2025-04-22 PROCEDURE — 1159F MED LIST DOCD IN RCRD: CPT | Performed by: SURGERY

## 2025-04-22 ASSESSMENT — PAIN SCALES - GENERAL: PAINLEVEL_OUTOF10: 0-NO PAIN

## 2025-04-22 NOTE — PROGRESS NOTES
History Of Present Illness  John Allen is a 74 y.o. male presenting with possible enlarging inguinal hernia.  He is well-known to me.  I done a previous cholecystectomy on him.  I had seen him for a small inguinal hernia going back to 2019.  Recently on his routine colonoscopies the had extreme difficulty because they could not sedate him much.  There is question whether or not this hernia was causing all of the discomfort.  He did have a CT scan a year ago when he was in [place].  That CT scan made no mention of a left inguinal hernia just a small right inguinal hernia.        Last Recorded Vitals  Blood pressure 147/81, pulse 76, temperature 36.5 °C (97.7 °F), weight 119 kg (261 lb 12.8 oz).  Physical Examination    Awake and alert.  Abdominal examination he has scars from his previous surgeries.  He has some mesh by his umbilicus from previous hernia repair.  On examination he really has no significant hernia on the right or left.  Her weakness which was consistent in what I felt in 2019.    Relevant Results  The ct scan from last year showed diverticulosis without any significant hernias.    Assessment/Plan patient with stable small groin weaknesses.  No evidence of any significant hernias.  CT scan did not show this either.  Presently I discussed with him he can just try to have better sedation for his next colonoscopy.  Nothing needs to be done at this point in time.  He will follow-up me as needed.    Daniel Ortiz MD FACS  Professor of Surgery  Himanshu Mckeon Chair in Surgical [place]  [facility] [employer]  [address] [place] [address], [id number]  Phone [phone]  email: [email or web address]

## 2025-04-29 NOTE — RESULT ENCOUNTER NOTE
Benign benign polyp.  Tubular adenoma.  Repeat colonoscopy to be scheduled (with anesthesia) in light of limited prep.

## 2025-05-01 DIAGNOSIS — Z12.11 COLON CANCER SCREENING: ICD-10-CM

## 2025-05-01 RX ORDER — POLYETHYLENE GLYCOL 3350, SODIUM SULFATE ANHYDROUS, SODIUM BICARBONATE, SODIUM CHLORIDE, POTASSIUM CHLORIDE 236; 22.74; 6.74; 5.86; 2.97 G/4L; G/4L; G/4L; G/4L; G/4L
POWDER, FOR SOLUTION ORAL
Qty: 4000 ML | Refills: 0 | Status: SHIPPED | OUTPATIENT
Start: 2025-05-01

## 2025-05-09 DIAGNOSIS — I10 ESSENTIAL HYPERTENSION: ICD-10-CM

## 2025-05-09 DIAGNOSIS — E78.2 MIXED HYPERLIPIDEMIA: ICD-10-CM

## 2025-05-09 DIAGNOSIS — N18.32 CKD STAGE G3B/A2, GFR 30-44 AND ALBUMIN CREATININE RATIO 30-299 MG/G (MULTI): ICD-10-CM

## 2025-05-09 DIAGNOSIS — K21.9 GASTROESOPHAGEAL REFLUX DISEASE WITHOUT ESOPHAGITIS: ICD-10-CM

## 2025-05-09 DIAGNOSIS — M10.9 GOUT, UNSPECIFIED CAUSE, UNSPECIFIED CHRONICITY, UNSPECIFIED SITE: ICD-10-CM

## 2025-05-09 RX ORDER — ALLOPURINOL 300 MG/1
300 TABLET ORAL DAILY
Qty: 90 TABLET | Refills: 3 | Status: SHIPPED | OUTPATIENT
Start: 2025-05-09

## 2025-05-09 RX ORDER — ATORVASTATIN CALCIUM 20 MG/1
20 TABLET, FILM COATED ORAL DAILY
Qty: 90 TABLET | Refills: 3 | Status: SHIPPED | OUTPATIENT
Start: 2025-05-09

## 2025-05-09 RX ORDER — LOSARTAN POTASSIUM 25 MG/1
25 TABLET ORAL DAILY
Qty: 90 TABLET | Refills: 3 | Status: SHIPPED | OUTPATIENT
Start: 2025-05-09

## 2025-05-09 RX ORDER — FAMOTIDINE 20 MG/1
20 TABLET, FILM COATED ORAL NIGHTLY
Qty: 90 TABLET | Refills: 3 | Status: SHIPPED | OUTPATIENT
Start: 2025-05-09 | End: 2026-05-09

## 2025-06-04 ENCOUNTER — APPOINTMENT (OUTPATIENT)
Dept: GASTROENTEROLOGY | Facility: EXTERNAL LOCATION | Age: 75
End: 2025-06-04
Payer: MEDICARE

## 2025-06-04 DIAGNOSIS — D12.9 BENIGN NEOPLASM OF RECTUM AND ANAL CANAL: ICD-10-CM

## 2025-06-04 DIAGNOSIS — D12.3 BENIGN NEOPLASM OF TRANSVERSE COLON: ICD-10-CM

## 2025-06-04 DIAGNOSIS — D12.5 BENIGN NEOPLASM OF SIGMOID COLON: ICD-10-CM

## 2025-06-04 DIAGNOSIS — Z86.0101 HISTORY OF ADENOMATOUS POLYP OF COLON: ICD-10-CM

## 2025-06-04 DIAGNOSIS — D12.8 BENIGN NEOPLASM OF RECTUM AND ANAL CANAL: ICD-10-CM

## 2025-06-04 DIAGNOSIS — Z12.11 SPECIAL SCREENING FOR MALIGNANT NEOPLASMS, COLON: Primary | ICD-10-CM

## 2025-06-04 DIAGNOSIS — D12.2 BENIGN NEOPLASM OF ASCENDING COLON: ICD-10-CM

## 2025-06-04 DIAGNOSIS — Z86.0100 HISTORY OF COLON POLYPS: ICD-10-CM

## 2025-06-04 PROCEDURE — 45385 COLONOSCOPY W/LESION REMOVAL: CPT | Performed by: INTERNAL MEDICINE

## 2025-06-04 PROCEDURE — 88305 TISSUE EXAM BY PATHOLOGIST: CPT

## 2025-06-04 PROCEDURE — 45380 COLONOSCOPY AND BIOPSY: CPT | Performed by: INTERNAL MEDICINE

## 2025-06-04 PROCEDURE — 88305 TISSUE EXAM BY PATHOLOGIST: CPT | Performed by: PATHOLOGY

## 2025-06-05 ENCOUNTER — LAB REQUISITION (OUTPATIENT)
Dept: LAB | Facility: HOSPITAL | Age: 75
End: 2025-06-05
Payer: MEDICARE

## 2025-06-05 DIAGNOSIS — Z86.0100 PERSONAL HISTORY OF COLON POLYPS, UNSPECIFIED: ICD-10-CM

## 2025-06-17 NOTE — RESULT ENCOUNTER NOTE
Benign polyps.  Tubular adenomas proximally.  Larger polyp in the rectum was hyperplastic.    No routine follow-up needed at the age when he would next be due but could reevaluate in 5 years and decide on appropriateness of testing at that time.

## 2025-07-03 ENCOUNTER — HOSPITAL ENCOUNTER (OUTPATIENT)
Dept: RADIOLOGY | Facility: CLINIC | Age: 75
Discharge: HOME | End: 2025-07-03
Payer: MEDICARE

## 2025-07-03 ENCOUNTER — OFFICE VISIT (OUTPATIENT)
Dept: ORTHOPEDIC SURGERY | Facility: CLINIC | Age: 75
End: 2025-07-03
Payer: MEDICARE

## 2025-07-03 DIAGNOSIS — M25.561 PAIN IN BOTH KNEES, UNSPECIFIED CHRONICITY: ICD-10-CM

## 2025-07-03 DIAGNOSIS — M25.562 PAIN IN BOTH KNEES, UNSPECIFIED CHRONICITY: ICD-10-CM

## 2025-07-03 DIAGNOSIS — M17.0 PRIMARY OSTEOARTHRITIS OF BOTH KNEES: Primary | ICD-10-CM

## 2025-07-03 PROCEDURE — 99204 OFFICE O/P NEW MOD 45 MIN: CPT | Performed by: FAMILY MEDICINE

## 2025-07-03 PROCEDURE — 99202 OFFICE O/P NEW SF 15 MIN: CPT | Performed by: FAMILY MEDICINE

## 2025-07-03 PROCEDURE — 73564 X-RAY EXAM KNEE 4 OR MORE: CPT | Mod: 50

## 2025-07-03 NOTE — PROGRESS NOTES
Patient ID: John Allen is a 74 y.o. male.    Procedures    Sports Medicine Office Note    Today's Date:  07/03/2025     HPI: John Allen is a 74 y.o. *** who presents today for ***    Today, 07/03/2025, ***    *** has no other complaints.     Physical Examination:     ***    Imaging:  Radiographs of the *** were reviewed and revealed ***.  The studies were reviewed by me personally in the office today.    === 10/13/23 ===    XR NECK SOFT TISSUE    - Impression -  Epiglottis appears at least mildly thickened      MACRO:  None    Signed by: Sandy Mistry 10/13/2023 9:19 AM  Dictation workstation:   [id number]      Procedure:  After consent was obtained, *** was prepped in a sterile fashion. Ultrasound guidance was used to help insure proper needle placement into the ***, decrease patient discomfort, and decrease collateral damage. The joint was visualized and Depo-Medrol 80 mg with lidocaine 4 mL & ropivacaine 4 mL were injected without any complications. Ultrasound images were saved on an internal file for later reference. The patient tolerated the procedure well and the area was cleaned and bandaged.    Visit Diagnoses   1. Pain in both knees, unspecified chronicity  XR knee 4+ views bilateral      2. Primary osteoarthritis of both knees            Assessment and Plan:    We reviewed the exam and x-ray findings and discussed the conservative and surgical treatment options. We agreed ***    **This note was dictated using Dragon speech recognition software and was not corrected for spelling or grammatical errors**.    Moody Kurtz MD  Sports Medicine Specialist  [facility] [place]       compartment  bilaterally. Large quadriceps enthesophyte bilaterally.  Signed by: Guilherme Benavides 7/4/2025 9:46 AM      Visit Diagnoses   1. Primary osteoarthritis of both knees        2. Pain in both knees, unspecified chronicity  XR knee 4+ views bilateral          Assessment and Plan:    We reviewed the exam and x-ray findings and discussed the conservative and surgical treatment options. We agreed he has advanced arthrosis of both knees that needs maintenance management.  He cannot take NSAIDs due to his age and kidney disease.  We discussed various injections.  He is going to review HA/gel versus PRP.  He will notify my  if he would like to continue with either of these treatment options.  I am happy to see him back as needed.    **This note was dictated using Dragon speech recognition software and was not corrected for spelling or grammatical errors**.    Moody Kurtz MD  Sports Medicine Specialist  [facility] [place]

## 2025-07-09 ENCOUNTER — APPOINTMENT (OUTPATIENT)
Dept: PRIMARY CARE | Facility: CLINIC | Age: 75
End: 2025-07-09
Payer: MEDICARE

## 2025-07-30 ENCOUNTER — APPOINTMENT (OUTPATIENT)
Dept: PRIMARY CARE | Facility: CLINIC | Age: 75
End: 2025-07-30
Payer: MEDICARE

## 2025-07-30 VITALS
WEIGHT: 260.25 LBS | OXYGEN SATURATION: 94 % | BODY MASS INDEX: 35.3 KG/M2 | HEART RATE: 69 BPM | DIASTOLIC BLOOD PRESSURE: 83 MMHG | SYSTOLIC BLOOD PRESSURE: 137 MMHG | TEMPERATURE: 97.2 F

## 2025-07-30 DIAGNOSIS — I10 ESSENTIAL HYPERTENSION: ICD-10-CM

## 2025-07-30 DIAGNOSIS — N18.32 CKD STAGE G3B/A2, GFR 30-44 AND ALBUMIN CREATININE RATIO 30-299 MG/G (MULTI): ICD-10-CM

## 2025-07-30 DIAGNOSIS — Z00.00 ROUTINE GENERAL MEDICAL EXAMINATION AT HEALTH CARE FACILITY: Primary | ICD-10-CM

## 2025-07-30 DIAGNOSIS — E66.812 CLASS 2 SEVERE OBESITY DUE TO EXCESS CALORIES WITH SERIOUS COMORBIDITY AND BODY MASS INDEX (BMI) OF 35.0 TO 35.9 IN ADULT: ICD-10-CM

## 2025-07-30 DIAGNOSIS — E66.01 CLASS 2 SEVERE OBESITY DUE TO EXCESS CALORIES WITH SERIOUS COMORBIDITY AND BODY MASS INDEX (BMI) OF 35.0 TO 35.9 IN ADULT: ICD-10-CM

## 2025-07-30 DIAGNOSIS — E79.0 HYPERURICEMIA: ICD-10-CM

## 2025-07-30 DIAGNOSIS — N40.0 BENIGN PROSTATIC HYPERPLASIA WITHOUT LOWER URINARY TRACT SYMPTOMS: ICD-10-CM

## 2025-07-30 DIAGNOSIS — E78.2 MIXED HYPERLIPIDEMIA: ICD-10-CM

## 2025-07-30 PROCEDURE — 1159F MED LIST DOCD IN RCRD: CPT | Performed by: INTERNAL MEDICINE

## 2025-07-30 PROCEDURE — G2211 COMPLEX E/M VISIT ADD ON: HCPCS | Performed by: INTERNAL MEDICINE

## 2025-07-30 PROCEDURE — 99214 OFFICE O/P EST MOD 30 MIN: CPT | Performed by: INTERNAL MEDICINE

## 2025-07-30 PROCEDURE — 1160F RVW MEDS BY RX/DR IN RCRD: CPT | Performed by: INTERNAL MEDICINE

## 2025-07-30 PROCEDURE — G0446 INTENS BEHAVE THER CARDIO DX: HCPCS | Performed by: INTERNAL MEDICINE

## 2025-07-30 PROCEDURE — 82570 ASSAY OF URINE CREATININE: CPT | Mod: CLIA WAIVED TEST | Performed by: INTERNAL MEDICINE

## 2025-07-30 PROCEDURE — G0439 PPPS, SUBSEQ VISIT: HCPCS | Performed by: INTERNAL MEDICINE

## 2025-07-30 PROCEDURE — 3079F DIAST BP 80-89 MM HG: CPT | Performed by: INTERNAL MEDICINE

## 2025-07-30 PROCEDURE — G0444 DEPRESSION SCREEN ANNUAL: HCPCS | Performed by: INTERNAL MEDICINE

## 2025-07-30 PROCEDURE — 1170F FXNL STATUS ASSESSED: CPT | Performed by: INTERNAL MEDICINE

## 2025-07-30 PROCEDURE — G0447 BEHAVIOR COUNSEL OBESITY 15M: HCPCS | Performed by: INTERNAL MEDICINE

## 2025-07-30 PROCEDURE — 82043 UR ALBUMIN QUANTITATIVE: CPT | Mod: CLIA WAIVED TEST | Performed by: INTERNAL MEDICINE

## 2025-07-30 PROCEDURE — 3075F SYST BP GE 130 - 139MM HG: CPT | Performed by: INTERNAL MEDICINE

## 2025-07-30 ASSESSMENT — PATIENT HEALTH QUESTIONNAIRE - PHQ9
2. FEELING DOWN, DEPRESSED OR HOPELESS: NOT AT ALL
1. LITTLE INTEREST OR PLEASURE IN DOING THINGS: NOT AT ALL
SUM OF ALL RESPONSES TO PHQ9 QUESTIONS 1 AND 2: 0

## 2025-07-30 ASSESSMENT — ACTIVITIES OF DAILY LIVING (ADL)
TAKING_MEDICATION: INDEPENDENT
BATHING: INDEPENDENT
DRESSING: INDEPENDENT
DOING_HOUSEWORK: INDEPENDENT
GROCERY_SHOPPING: INDEPENDENT
MANAGING_FINANCES: INDEPENDENT

## 2025-07-30 ASSESSMENT — ENCOUNTER SYMPTOMS
OCCASIONAL FEELINGS OF UNSTEADINESS: 1
DEPRESSION: 0
LOSS OF SENSATION IN FEET: 0

## 2025-07-30 NOTE — PROGRESS NOTES
Subjective   John Allen is a 75 y.o. male who presents for Medicare Annual Wellness Visit Initial.  HPI  Past medical history includes hypertension, CKD G3a/A2, hyperlipidemia, obesity, gastritis and PUD, and arthritic problems.    Having knee issues, got worse after travel in March.  Saw Dr. Joseph ([facility]) and got a CSI, feeling a little bit better.    See prior visit regarding LLQ pressure.  Had difficult colonoscopy in April, seen by Dr. Ortiz after and felt that there were no significant hernias and to proceed with repeat colonoscopy with better sedation.  He did have a CT scan in 2024: Findings included diverticulosis, right renal calculi, hepatic steatosis, simple renal cysts, right hernia, and enlarged prostate, 5.6cm across.     Occasional vertigo, has had vestibular rehab in the past; ever since cochlear surgery.  More recently getting it while trying to read/focus on nearby object, not necessarily with movement.     He also has a lot of arthritic issues, he has had bilateral shoulder replacement, says he also needs bilateral knee replacements but trying to avoid, coping.  Also with hx of sciatic pain.     No urinary changes or concerns.  Stable nocturia, ~q4 hours; not concerning.  Occasionally with delayed stream, not bothersome.     He is , goes to the gym fairly regularly, remote smoker.   Exercising regularly, weights, aerobics 4 times per week.      Objective   /89   Pulse 76   Temp 36.2 °C (97.2 °F)   Wt 118 kg (260 lb 4 oz)   SpO2 94%   BMI 35.30 kg/m²    Physical Exam  Gen: NAD, pleasant, A&Ox3  HEENT: PERRL, EOMI, MMM, OP clear  Neck: supple, no thyromegaly, no JVD, normal carotid upstroke  Pulm: lungs CTAB, good air movement  CV: RRR, no m/r/g, 2+ DP pulses  Abd: NABS, soft, mild epigastric discomfort, ND no HSM  Ext: no peripheral edema  Neuro: CN II-XII intact, no focal sensory or motor deficits, normal reflexes  Skin: Scattered benign lesions including seborrheic  keratoses and angiomas    Assessment/Plan      Constipation with intermittent pain: recommend daily fiber supplementation (Metamucil); very happy with regularity since starting, continue; monitor symptoms, likely related to constipation, given low frequency and brief episodes likely benign, reassured    Obesity: considering GLP-1, readdress next visit     GERD/cough:   -Continue pantoprazole prn, famotidine daily; f/u with GI prn  - also saw ENT on 10/19/2023, Dr. Diamond, who did laryngoscopy and suspected LPR and recommended using PPI   - EGD, 5/16/2024, with small hiatal hernia and gastritis, no H. pylori     Inguinal hernia: Referred to general surgery and had follow-up in APR 2025; can return on elective basis currently asymptomatic     Hypertension/CKD G3a/A2:   - improved albuminuria blood pressure control since starting losartan 25 mg, continue  - unremarkable EKG, JUL 2022; off beta-blocker without change in pattern of palpitations, minimally symptomatic  - KFE give 2.5% risk for HD at 5 years    Hyperlipidemia: Continue atorvastatin 20mg     Hx of gout: recheck uric acid levels, cont allopurinol     Health maintenance  -Last colonoscopy: 4/16 and 6/5/2025, 5 adenomatous polyps, repeat in 3 years  -PSA: 2.03 ng/mL JUL 2022  -Smoking history: Remote smoker, reports negative AAA  -Counseled regarding diet and exercise  -Immunizations: UTD  - annual derm visit, saw in JUN 2025  -Followup in 6-12 months, sooner if needed    Problem List Items Addressed This Visit    None  5-10 minutes were spent on screening for Depression.  The 10-year ASCVD risk score (Cl BETANCOURT, et al., 2019) is: 31.7%    Values used to calculate the score:      Age: 75 years      Clincally relevant sex: Male      Is Non- : No      Diabetic: No      Tobacco smoker: No      Systolic Blood Pressure: 137 mmHg      Is BP treated: Yes      HDL Cholesterol: 39.6 mg/dL      Total Cholesterol: 163 mg/dL  Cardiovascular risk  discussed and modifiable risk factors addressed.  Discussion included options of pharmaceutical interventions and recommended lifestyle modifications, including nutritional choices, exercise, and elimination of habits contributing to risk.   >15 minutes spent on assessment and discussion.  BMI was above normal measurement. Current weight: 118 kg (260 lb 4 oz)  Weight change since last visit (-) denotes wt loss -1.55 lbs   Weight loss needed to achieve BMI 25:   Lbs  Weight loss needed to achieve BMI 30:   Lbs  Provided instructions on dietary changes  Provided instructions on exercise  Advised to Increase physical activity.  >15 minutes spent counseling    Alejandro Argueta MD

## 2025-07-31 LAB
ALBUMIN SERPL-MCNC: 4.5 G/DL (ref 3.6–5.1)
ALP SERPL-CCNC: 65 U/L (ref 35–144)
ALT SERPL-CCNC: 17 U/L (ref 9–46)
ANION GAP SERPL CALCULATED.4IONS-SCNC: 9 MMOL/L (CALC) (ref 7–17)
AST SERPL-CCNC: 14 U/L (ref 10–35)
BILIRUB SERPL-MCNC: 0.8 MG/DL (ref 0.2–1.2)
BUN SERPL-MCNC: 27 MG/DL (ref 7–25)
CALCIUM SERPL-MCNC: 9.9 MG/DL (ref 8.6–10.3)
CHLORIDE SERPL-SCNC: 106 MMOL/L (ref 98–110)
CHOLEST SERPL-MCNC: 154 MG/DL
CHOLEST/HDLC SERPL: 3.3 (CALC)
CO2 SERPL-SCNC: 27 MMOL/L (ref 20–32)
CREAT SERPL-MCNC: 1.57 MG/DL (ref 0.7–1.28)
EGFRCR SERPLBLD CKD-EPI 2021: 46 ML/MIN/1.73M2
ERYTHROCYTE [DISTWIDTH] IN BLOOD BY AUTOMATED COUNT: 13.6 % (ref 11–15)
GLUCOSE SERPL-MCNC: 95 MG/DL (ref 65–99)
HCT VFR BLD AUTO: 38.4 % (ref 38.5–50)
HDLC SERPL-MCNC: 46 MG/DL
HGB BLD-MCNC: 12.7 G/DL (ref 13.2–17.1)
LDLC SERPL CALC-MCNC: 85 MG/DL (CALC)
MCH RBC QN AUTO: 30.8 PG (ref 27–33)
MCHC RBC AUTO-ENTMCNC: 33.1 G/DL (ref 32–36)
MCV RBC AUTO: 93.2 FL (ref 80–100)
NONHDLC SERPL-MCNC: 108 MG/DL (CALC)
PLATELET # BLD AUTO: 174 THOUSAND/UL (ref 140–400)
PMV BLD REES-ECKER: 10.1 FL (ref 7.5–12.5)
POTASSIUM SERPL-SCNC: 4.5 MMOL/L (ref 3.5–5.3)
PROT SERPL-MCNC: 7.1 G/DL (ref 6.1–8.1)
PSA SERPL-MCNC: 2.33 NG/ML
RBC # BLD AUTO: 4.12 MILLION/UL (ref 4.2–5.8)
SODIUM SERPL-SCNC: 142 MMOL/L (ref 135–146)
TRIGL SERPL-MCNC: 125 MG/DL
URATE SERPL-MCNC: 6.1 MG/DL (ref 4–8)
WBC # BLD AUTO: 6.3 THOUSAND/UL (ref 3.8–10.8)

## 2026-01-21 ENCOUNTER — APPOINTMENT (OUTPATIENT)
Dept: PRIMARY CARE | Facility: CLINIC | Age: 76
End: 2026-01-21
Payer: MEDICARE